# Patient Record
Sex: MALE | Race: WHITE | NOT HISPANIC OR LATINO | Employment: FULL TIME | URBAN - METROPOLITAN AREA
[De-identification: names, ages, dates, MRNs, and addresses within clinical notes are randomized per-mention and may not be internally consistent; named-entity substitution may affect disease eponyms.]

---

## 2017-01-20 DIAGNOSIS — E11.9 TYPE 2 DIABETES MELLITUS WITHOUT COMPLICATION, WITHOUT LONG-TERM CURRENT USE OF INSULIN (H): ICD-10-CM

## 2017-01-20 LAB — HBA1C MFR BLD: 7.1 % (ref 4.1–5.7)

## 2017-01-25 DIAGNOSIS — E11.9 TYPE 2 DIABETES MELLITUS WITHOUT COMPLICATION, WITHOUT LONG-TERM CURRENT USE OF INSULIN (H): Primary | ICD-10-CM

## 2017-01-25 RX ORDER — METFORMIN HCL 500 MG
TABLET, EXTENDED RELEASE 24 HR ORAL
Qty: 180 TABLET | Refills: 1 | Status: SHIPPED | OUTPATIENT
Start: 2017-01-25 | End: 2017-06-09

## 2017-01-25 NOTE — TELEPHONE ENCOUNTER
Prescription approved per [facility] Refill Protocol. Increase in dose per Dr Richards

## 2017-04-05 DIAGNOSIS — I10 ESSENTIAL HYPERTENSION: Primary | ICD-10-CM

## 2017-04-05 DIAGNOSIS — G47.00 INSOMNIA, UNSPECIFIED TYPE: ICD-10-CM

## 2017-04-05 RX ORDER — ZOLPIDEM TARTRATE 10 MG/1
10 TABLET ORAL
Qty: 30 TABLET | Refills: 0
Start: 2017-04-05 | End: 2017-06-09

## 2017-04-05 RX ORDER — PROPRANOLOL HYDROCHLORIDE 160 MG/1
160 CAPSULE, EXTENDED RELEASE ORAL DAILY
Qty: 90 CAPSULE | Refills: 0 | Status: SHIPPED | OUTPATIENT
Start: 2017-04-05 | End: 2017-06-09

## 2017-06-09 ENCOUNTER — OFFICE VISIT (OUTPATIENT)
Dept: FAMILY MEDICINE | Facility: CLINIC | Age: 52
End: 2017-06-09

## 2017-06-09 VITALS
OXYGEN SATURATION: 98 % | WEIGHT: 265.12 LBS | DIASTOLIC BLOOD PRESSURE: 89 MMHG | SYSTOLIC BLOOD PRESSURE: 138 MMHG | HEART RATE: 78 BPM | BODY MASS INDEX: 35.14 KG/M2 | HEIGHT: 73 IN | TEMPERATURE: 97.8 F

## 2017-06-09 DIAGNOSIS — G47.00 INSOMNIA, UNSPECIFIED TYPE: ICD-10-CM

## 2017-06-09 DIAGNOSIS — E11.9 TYPE 2 DIABETES MELLITUS WITHOUT COMPLICATION, WITHOUT LONG-TERM CURRENT USE OF INSULIN (H): ICD-10-CM

## 2017-06-09 DIAGNOSIS — Z23 NEED FOR PNEUMOCOCCAL VACCINATION: ICD-10-CM

## 2017-06-09 DIAGNOSIS — Z13.220 SCREENING CHOLESTEROL LEVEL: ICD-10-CM

## 2017-06-09 DIAGNOSIS — Z00.00 PREVENTATIVE HEALTH CARE: Primary | ICD-10-CM

## 2017-06-09 DIAGNOSIS — Z12.5 SCREENING FOR PROSTATE CANCER: ICD-10-CM

## 2017-06-09 DIAGNOSIS — Z12.11 SCREEN FOR COLON CANCER: ICD-10-CM

## 2017-06-09 DIAGNOSIS — I10 ESSENTIAL HYPERTENSION: ICD-10-CM

## 2017-06-09 LAB
ANION GAP SERPL CALCULATED.3IONS-SCNC: 7 MMOL/L (ref 3–14)
BUN SERPL-MCNC: 17 MG/DL (ref 7–30)
CALCIUM SERPL-MCNC: 9.1 MG/DL (ref 8.5–10.1)
CHLORIDE SERPL-SCNC: 106 MMOL/L (ref 94–109)
CHOLEST SERPL-MCNC: 176 MG/DL
CO2 SERPL-SCNC: 30 MMOL/L (ref 20–32)
CREAT SERPL-MCNC: 0.82 MG/DL (ref 0.66–1.25)
CREAT UR-MCNC: 145 MG/DL
GFR SERPL CREATININE-BSD FRML MDRD: ABNORMAL ML/MIN/1.7M2
GLUCOSE SERPL-MCNC: 142 MG/DL (ref 70–99)
HBA1C MFR BLD: 7.3 % (ref 4.1–5.7)
HDLC SERPL-MCNC: 48 MG/DL
LDLC SERPL CALC-MCNC: 103 MG/DL
MICROALBUMIN UR-MCNC: 17 MG/L
MICROALBUMIN/CREAT UR: 11.79 MG/G CR (ref 0–17)
NONHDLC SERPL-MCNC: 128 MG/DL
POTASSIUM SERPL-SCNC: 4.6 MMOL/L (ref 3.4–5.3)
PSA SERPL-ACNC: 0.82 UG/L (ref 0–4)
SODIUM SERPL-SCNC: 143 MMOL/L (ref 133–144)
TRIGL SERPL-MCNC: 125 MG/DL

## 2017-06-09 RX ORDER — ZOLPIDEM TARTRATE 10 MG/1
10 TABLET ORAL
Qty: 30 TABLET | Refills: 2 | Status: SHIPPED | OUTPATIENT
Start: 2017-06-09 | End: 2018-02-20

## 2017-06-09 RX ORDER — METFORMIN HCL 500 MG
TABLET, EXTENDED RELEASE 24 HR ORAL
Qty: 180 TABLET | Refills: 4 | Status: SHIPPED | OUTPATIENT
Start: 2017-06-09 | End: 2018-01-23

## 2017-06-09 RX ORDER — PROPRANOLOL HYDROCHLORIDE 160 MG/1
160 CAPSULE, EXTENDED RELEASE ORAL DAILY
Qty: 90 CAPSULE | Refills: 4 | Status: SHIPPED | OUTPATIENT
Start: 2017-06-09 | End: 2017-09-27

## 2017-06-09 ASSESSMENT — PAIN SCALES - GENERAL: PAINLEVEL: NO PAIN (0)

## 2017-06-09 NOTE — MR AVS SNAPSHOT
After Visit Summary   6/9/2017    Dougie Nino    MRN: [id number]           Patient Information     Date Of Birth          1965        Visit Information        Provider Department      6/9/2017 8:40 AM Live Richards MD [facility]        Today's Diagnoses     Screen for colon cancer    -  1    Need for pneumococcal vaccination        Essential hypertension        Screening cholesterol level        Type 2 diabetes mellitus without complication, without long-term current use of insulin (H)        Insomnia, unspecified type        Screening for prostate cancer          Care Instructions      Preventive Health Recommendations  Male Ages 50 - 64    Yearly exam:             See your health care provider every year in order to  o   Review health changes.   o   Discuss preventive care.    o   Review your medicines if your doctor has prescribed any.     Have a cholesterol test every 5 years, or more frequently if you are at risk for high cholesterol/heart disease.     Have a diabetes test (fasting glucose) every three years. If you are at risk for diabetes, you should have this test more often.     Have a colonoscopy at age 50, or have a yearly FIT test (stool test). These exams will check for colon cancer.      Talk with your health care provider about whether or not a prostate cancer screening test (PSA) is right for you.    You should be tested each year for STDs (sexually transmitted diseases), if you re at risk.     Shots: Get a flu shot each year. Get a tetanus shot every 10 years.     Nutrition:    Eat at least 5 servings of fruits and vegetables daily.     Eat whole-grain bread, whole-wheat pasta and brown rice instead of white grains and rice.     Talk to your provider about Calcium and Vitamin D.     Lifestyle    Exercise for at least 150 minutes a week (30 minutes a day, 5 days a week). This will help you control your weight and prevent disease.     Limit alcohol to one drink per  "day.     No smoking.     Wear sunscreen to prevent skin cancer.     See your dentist every six months for an exam and cleaning.     See your eye doctor every 1 to 2 years.            Follow-ups after your visit        Future tests that were ordered for you today     Open Future Orders        Priority Expected Expires Ordered    Fecal colorectal cancer screen FIT Routine 6/14/2017 8/16/2017 6/9/2017            Who to contact     Please call your clinic at [phone] to:    Ask questions about your health    Make or cancel appointments    Discuss your medicines    Learn about your test results    Speak to your doctor   If you have compliments or concerns about an experience at your clinic, or if you wish to file a complaint, please contact [facility] Patient Relations at [phone] or email us at [email or web address]         Additional Information About Your Visit        [employer] Information     [email or web address] gives you secure access to your electronic health record. If you see a primary care provider, you can also send messages to your care team and make appointments. If you have questions, please call your primary care clinic.  If you do not have a primary care provider, please call [phone] and they will assist you.      [email or web address] is an electronic gateway that provides easy, online access to your medical records. With [email or web address], you can request a clinic appointment, read your test results, renew a prescription or communicate with your care team.     To access your existing account, please contact your [facility] or call [phone] for assistance.        Care EveryWhere ID     This is your Care EveryWhere ID. This could be used by other organizations to access your [place] medical records  [id number]        Your Vitals Were     Pulse Temperature Height Pulse Oximetry BMI (Body Mass Index)       78 97.8  F (36.6  C) 6' 0.65\" (184.5 cm) 98% 35.32 " kg/m2        Blood Pressure from Last 3 Encounters:   06/09/17 138/89   05/23/16 131/85   10/22/15 129/84    Weight from Last 3 Encounters:   06/09/17 265 lb 1.9 oz (120.3 kg)   05/23/16 269 lb 0.6 oz (122 kg)   10/22/15 265 lb (120.2 kg)              We Performed the Following     ADMIN: Vaccine, Initial (28916)     Albumin Random Urine Quantitative     Basic metabolic panel     Hemoglobin A1c ([place])     Lipid Profile     Pneumococcal vaccine 13 valent PCV13 IM (Prevnar) [42216]     Prostate spec antigen screen          Where to get your medicines      These medications were sent to [facility] - [place], [place] - [address]  [address], [facility] [address]     Phone:  [phone]     metFORMIN 500 MG 24 hr tablet    propranolol 160 MG 24 hr capsule         Some of these will need a paper prescription and others can be bought over the counter.  Ask your nurse if you have questions.     Bring a paper prescription for each of these medications     zolpidem 10 MG tablet          Primary Care Provider Office Phone # Fax #    Live Richards [phone]       [facility] [address]  [facility] [address]        Thank you!     Thank you for choosing [facility]  for your care. Our goal is always to provide you with excellent care. Hearing back from our patients is one way we can continue to improve our services. Please take a few minutes to complete the written survey that you may receive in the mail after your visit with us. Thank you!             Your Updated Medication List - Protect others around you: Learn how to safely use, store and throw away your medicines at www.disposemymeds.org.          This list is accurate as of: 6/9/17  9:19 AM.  Always use your most recent med list.                   Brand Name Dispense Instructions for use    Aspirin 81 MG Tbdp      Take 1 tablet by mouth daily       ciprofloxacin 500 MG tablet    CIPRO    28  tablet    Take 1 tablet (500 mg) by mouth 2 times daily       ELEUTHERO PO      Take 1 tablet by mouth daily       GINKGO PO      Take 1 tablet by mouth daily       metFORMIN 500 MG 24 hr tablet    GLUCOPHAGE-XR    180 tablet    Take 1 po daily with breakfast, and 1 daily with evening meal       propranolol 160 MG 24 hr capsule    INDERAL LA    90 capsule    Take 1 capsule (160 mg) by mouth daily       zolpidem 10 MG tablet    AMBIEN    30 tablet    Take 1 tablet (10 mg) by mouth nightly as needed for sleep

## 2017-06-09 NOTE — NURSING NOTE
"52 year old  Chief Complaint   Patient presents with     Physical     Refill Request       Blood pressure 138/89, pulse 78, temperature 97.8  F (36.6  C), height 6' 0.65\" (184.5 cm), weight 265 lb 1.9 oz (120.3 kg), SpO2 98 %. Body mass index is 35.32 kg/(m^2).  Patient Active Problem List   Diagnosis     Insomnia     Type 2 diabetes mellitus without complication (H)     Essential hypertension     Preventative health care     Essential hypertension with goal blood pressure less than 140/90       Wt Readings from Last 2 Encounters:   06/09/17 265 lb 1.9 oz (120.3 kg)   05/23/16 269 lb 0.6 oz (122 kg)     BP Readings from Last 3 Encounters:   06/09/17 138/89   05/23/16 131/85   10/22/15 129/84         Current Outpatient Prescriptions   Medication     metFORMIN (GLUCOPHAGE-XR) 500 MG 24 hr tablet     propranolol (INDERAL LA) 160 MG 24 hr capsule     zolpidem (AMBIEN) 10 MG tablet     ciprofloxacin (CIPRO) 500 MG tablet     Ginkgo Biloba (GINKGO PO)     Ginseng (ELEUTHERO PO)     Aspirin 81 MG TBDP     [DISCONTINUED] propranolol (INDERAL LA) 160 MG 24 hr capsule     [DISCONTINUED] metFORMIN (GLUCOPHAGE-XR) 500 MG 24 hr tablet     No current facility-administered medications for this visit.        Social History   Substance Use Topics     Smoking status: Never Smoker     Smokeless tobacco: Never Used     Alcohol use Yes      Comment: rarely       Health Maintenance Due   Topic Date Due     PNEUMOVAX 1X HI RISK PATIENT < 65 (NO IB MSG)  04/04/1967     FIT Q1 YR  08/20/2013     EYE EXAM Q1 YEAR  11/05/2016     FOOT EXAM Q1 YEAR  05/23/2017     CREATININE Q1 YEAR  05/23/2017     LIPID MONITORING Q1 YEAR  05/23/2017     MICROALBUMIN Q1 YEAR  05/23/2017       No results found for: DENISHA Johnson CMA  June 9, 2017 9:36 AM    Screening Questionnaire for Adult Immunization    Are you sick today?   No   Do you have allergies to medications, food, a vaccine component or latex?   No   Have you ever had a serious reaction after " receiving a vaccination?   No   Do you have a long-term health problem with heart disease, lung disease, asthma, kidney disease, metabolic disease (e.g. diabetes), anemia, or other blood disorder?   No   Do you have cancer, leukemia, HIV/AIDS, or any other immune system problem?   No   In the past 3 months, have you taken medications that affect  your immune system, such as prednisone, other steroids, or anticancer drugs; drugs for the treatment of rheumatoid arthritis, Crohn s disease, or psoriasis; or have you had radiation treatments?   No   Have you had a seizure, or a brain or other nervous system problem?   No   During the past year, have you received a transfusion of blood or blood     products, or been given immune (gamma) globulin or antiviral drug?   No   For women: Are you pregnant or is there a chance you could become        pregnant during the next month?   No   Have you received any vaccinations in the past 4 weeks?   No     Immunization questionnaire answers were all negative.     Given by Elicia Johnson. Patient instructed to remain in clinic for 20 minutes afterwards, and to report any adverse reaction to me immediately.       Screening performed by Elicia Johnson on 6/9/2017 at 9:36 AM.

## 2017-06-10 NOTE — PROGRESS NOTES
CHIEF COMPLAINT:  Physical exam.      HISTORY OF PRESENT ILLNESS:  Dougie is a 52 year old here for the above.  Overall he is doing quite well.  He was laid off from [employer] and now works for [employer].  He is technically based out of [place] and he is spending about 3 weeks out of every month there.  If he is not there, he is typically in [place] where he has a large project under way.      He is  and has kids and they have decided to stay here for the time being.  Overall, things are working out fairly well.      Dougie shares with me that he had 7 years of some pretty significant troubles.  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  In 2011, he had to file for bankruptcy.  He was on a 5-year program and that ended about a year ago.  He had been renting a house at one point, subleased it and was sued by the landlord.  He had a large settlement with that.  In 2014, he bought a house.  Finally, things have really turned around for him.      Active medical problems including insomnia, type 2 diabetes without complication, and essential hypertension.      From a medication standpoint, he is on Inderal- mg daily.  He has been on a variety of different medications and this really helped him more than anything.  He could not tolerate the ACE inhibitors.  He does not like the idea of being on a diuretic.  He is on metformin  mg daily with breakfast for his type 2 diabetes and for the most part, things look quite good.  He is not on a statin.  From a diabetic care standpoint, A1c has been less than 8%.  LDL levels have been very close to 100.  Blood pressure is at goal at 138/89.  He has not been taking the aspirin though we have talked about this in the past.  He simply forgot to do it.  He will start that up again.  He does not smoke.      SOCIAL, FAMILY AND PAST SURGICAL HISTORY:  Unchanged.      SOCIAL HISTORY:  Update above.      HEALTHCARE MAINTENANCE:  He has had some changes  notices this mainly with his reading glasses.  He understands the importance of having an annual eye exam especially in the context of diabetes.  Hearing is fine and there has been no real change there.  Dental care occurs every 6 months.  /89.  Body mass index is 35.32.  His weight is down 4 pounds intentionally.  He would like to lose some more weight, of course.  He had a flu shot in the fall.  He is up-to-date with a tetanus booster.  He needs PCV 13 so we are going to go ahead and do that today.  We discussed colon cancer screening and would like to do that in the form of a FIT card.  We will check a PSA 50, lipid panel, A1c, basic metabolic panel and a urine microalbumin level.      REVIEW OF SYSTEMS:  A 10-point review negative.      OBJECTIVE:  Dougie is in no distress.  Affect upbeat.  Good eye contact.  /89 with a pulse of 78.  Temperature is 97.8.  He is 6 feet 0.65 inches in height and weighs 265 with a BMI of 35.32.  O2 sats 98% on room air.  HEENT:  Head is normocephalic, atraumatic.  Ears are free of any cerumen.  The TMs look fine.  There is no pain with palpation of the sinuses.  Eyes are grossly normal.  Nose is free of any congestion or discharge.  Teeth in good repair.  Mucous membranes are moist.  Throat looks benign.  Neck is supple without adenopathy or thyromegaly.  No carotid bruits are heard, no JVD.  Back is smooth and straight.  No pain to percussion.  No CVA tenderness.  Lungs are clear to auscultation bilaterally.  Heart reveals a regular rate and rhythm without murmur.  Abdomen is benign.  Ankles are free of any edema.  Skin is warm and dry.      LABORATORY DATA:  Labs are pending.      ASSESSMENT AND PLAN:   1. Adult physical exam, up-to-date with health care maintenance strategies.   2. Type 2 diabetes without complication, without the long-term use of insulin.  Continue with the metformin.  We will check his labs.  Foot exam was performed today and monofilament testing was  perfect on the tops and bottoms of the feet.  No changes.  Good dorsalis pedis pulses are present.  At near or at optimal care.  His LDL is the only thing that has been holding him back essentially.     3. He has had a lot of stress in his life and has had some degree of PTSD like symptoms.  I will be happy to refer him to our therapists at the [facility].  He is open to talking to somebody.  He is not looking for any medication at this time.   4. Refills on his meds were taken care of including zolpidem, propranolol and metformin.     5. Call with any problems or questions.  I look forward to seeing him back in approximately 6 months for diabetic care and 1 year for a physical exam.

## 2017-06-14 NOTE — NURSING NOTE
Rx called in to pt's pharmacy in [facility]  Zolpidem # 30 with 2 refills   Yesi Whittaker RN  June 9, 2017 5:05 PM

## 2017-07-24 DIAGNOSIS — Z12.11 SCREEN FOR COLON CANCER: ICD-10-CM

## 2017-07-24 PROCEDURE — 82274 ASSAY TEST FOR BLOOD FECAL: CPT | Performed by: FAMILY MEDICINE

## 2017-07-27 LAB — HEMOCCULT STL QL IA: NEGATIVE

## 2017-09-27 DIAGNOSIS — I10 ESSENTIAL HYPERTENSION: ICD-10-CM

## 2017-09-27 DIAGNOSIS — I10 ESSENTIAL HYPERTENSION, BENIGN: Primary | ICD-10-CM

## 2017-09-27 RX ORDER — PROPRANOLOL HYDROCHLORIDE 160 MG/1
160 CAPSULE, EXTENDED RELEASE ORAL DAILY
Qty: 90 CAPSULE | Refills: 3 | Status: SHIPPED | OUTPATIENT
Start: 2017-09-27 | End: 2018-08-21

## 2018-01-23 ENCOUNTER — OFFICE VISIT (OUTPATIENT)
Dept: FAMILY MEDICINE | Facility: CLINIC | Age: 53
End: 2018-01-23
Payer: COMMERCIAL

## 2018-01-23 ENCOUNTER — OFFICE VISIT (OUTPATIENT)
Dept: PHARMACY | Facility: CLINIC | Age: 53
End: 2018-01-23
Payer: COMMERCIAL

## 2018-01-23 VITALS
OXYGEN SATURATION: 96 % | HEIGHT: 73 IN | DIASTOLIC BLOOD PRESSURE: 85 MMHG | HEART RATE: 95 BPM | SYSTOLIC BLOOD PRESSURE: 130 MMHG | TEMPERATURE: 97.7 F | BODY MASS INDEX: 33.13 KG/M2 | WEIGHT: 250.01 LBS

## 2018-01-23 VITALS
BODY MASS INDEX: 33.53 KG/M2 | DIASTOLIC BLOOD PRESSURE: 88 MMHG | HEIGHT: 73 IN | OXYGEN SATURATION: 97 % | RESPIRATION RATE: 14 BRPM | TEMPERATURE: 98 F | WEIGHT: 253 LBS | HEART RATE: 89 BPM | SYSTOLIC BLOOD PRESSURE: 136 MMHG

## 2018-01-23 DIAGNOSIS — E11.9 TYPE 2 DIABETES MELLITUS WITHOUT COMPLICATION, WITHOUT LONG-TERM CURRENT USE OF INSULIN (H): Primary | ICD-10-CM

## 2018-01-23 DIAGNOSIS — R35.0 URINARY FREQUENCY: ICD-10-CM

## 2018-01-23 DIAGNOSIS — I10 ESSENTIAL HYPERTENSION: ICD-10-CM

## 2018-01-23 LAB
BILIRUBIN UR: NEGATIVE
BLOOD UR: NEGATIVE
BUN SERPL-MCNC: 18 MG/DL (ref 7–30)
CALCIUM SERPL-MCNC: 9.5 MG/DL (ref 8.5–10.4)
CHLORIDE SERPLBLD-SCNC: 100 MMOL/L (ref 94–109)
CO2 SERPL-SCNC: 28 MMOL/L (ref 20–32)
CREAT SERPL-MCNC: 0.9 MG/DL (ref 0.8–1.5)
EGFR CALCULATED (BLACK REFERENCE): 114.4
EGFR CALCULATED (NON BLACK REFERENCE): 94.6
GLUCOSE SERPL-MCNC: 541 MG/DL (ref 60–109)
GLUCOSE URINE: ABNORMAL
HBA1C MFR BLD: 14 % (ref 4.1–5.7)
KETONES UR QL: NEGATIVE
LEUKOCYTE ESTERASE UR: NEGATIVE
NITRITE UR QL STRIP: NEGATIVE
PH UR STRIP: 6 [PH] (ref 5–7)
POTASSIUM SERPL-SCNC: 5 MMOL/L (ref 3.4–5.3)
PROTEIN UR: NEGATIVE
SODIUM SERPL-SCNC: 137 MMOL/L (ref 133–144)
SP GR UR STRIP: 1.01
TSH SERPL DL<=0.005 MIU/L-ACNC: 2.99 MU/L (ref 0.4–4)
UROBILINOGEN UR STRIP-ACNC: ABNORMAL

## 2018-01-23 RX ORDER — METFORMIN HCL 500 MG
1000 TABLET, EXTENDED RELEASE 24 HR ORAL
Qty: 30 TABLET | Refills: 1 | Status: SHIPPED | OUTPATIENT
Start: 2018-01-23 | End: 2018-01-23

## 2018-01-23 RX ORDER — METFORMIN HCL 500 MG
1000 TABLET, EXTENDED RELEASE 24 HR ORAL
Qty: 60 TABLET | Refills: 1 | Status: SHIPPED | OUTPATIENT
Start: 2018-01-23 | End: 2018-01-23

## 2018-01-23 RX ORDER — METFORMIN HCL 500 MG
1000 TABLET, EXTENDED RELEASE 24 HR ORAL
Qty: 60 TABLET | Refills: 1 | Status: SHIPPED | OUTPATIENT
Start: 2018-01-23 | End: 2018-02-08

## 2018-01-23 ASSESSMENT — ANXIETY QUESTIONNAIRES
7. FEELING AFRAID AS IF SOMETHING AWFUL MIGHT HAPPEN: NOT AT ALL
2. NOT BEING ABLE TO STOP OR CONTROL WORRYING: NOT AT ALL
IF YOU CHECKED OFF ANY PROBLEMS ON THIS QUESTIONNAIRE, HOW DIFFICULT HAVE THESE PROBLEMS MADE IT FOR YOU TO DO YOUR WORK, TAKE CARE OF THINGS AT HOME, OR GET ALONG WITH OTHER PEOPLE: NOT DIFFICULT AT ALL
5. BEING SO RESTLESS THAT IT IS HARD TO SIT STILL: NOT AT ALL
GAD7 TOTAL SCORE: 0
1. FEELING NERVOUS, ANXIOUS, OR ON EDGE: NOT AT ALL
6. BECOMING EASILY ANNOYED OR IRRITABLE: NOT AT ALL
3. WORRYING TOO MUCH ABOUT DIFFERENT THINGS: NOT AT ALL

## 2018-01-23 ASSESSMENT — PATIENT HEALTH QUESTIONNAIRE - PHQ9
SUM OF ALL RESPONSES TO PHQ QUESTIONS 1-9: 0
5. POOR APPETITE OR OVEREATING: NOT AT ALL

## 2018-01-23 ASSESSMENT — PAIN SCALES - GENERAL
PAINLEVEL: NO PAIN (0)
PAINLEVEL: NO PAIN (0)

## 2018-01-23 NOTE — MR AVS SNAPSHOT
After Visit Summary   1/23/2018    Dougie Nino    MRN: [id number]           Patient Information     Date Of Birth          1965        Visit Information        Provider Department      1/23/2018 8:40 AM Deena Pelletier PA-C [facility]        Today's Diagnoses     Urinary frequency    -  1    Type 2 diabetes mellitus without complication, without long-term current use of insulin (H)        Essential hypertension          Care Instructions    Diabetic education was ordered.  It is important to start checking your blood sugars and learn more about diet expecially when you are on the road traveling.    We increased your metformin and changed to a long acting formulation.    If you are able to make an appointment with the diabetic educator you can follow up with Dr. Richards in 3 months.    Your blood pressure was elevated today but it was improved on recheck.  For now continue on your current dose of medication. Check your blood pressures once weekly and if consistently above 130/90 we should consider additional treatment for your blood pressure.   You can manage with lifestyle modifications with a DASH diet and I have enclosed information on that.    Consider taking a baby aspirin once daily if you do not already do so.    Please let me know if you have any questions.  Thank you for allowing me to participate in your care.  It was my pleasure meeting you.  Lisa Pelletier PA-C      Eating Heart-Healthy Food: Using the DASH Plan    Eating for your heart doesn t have to be hard or boring. You just need to know how to make healthier choices. The DASH eating plan has been developed to help you do just that. DASH stands for Dietary Approaches to Stop Hypertension. It is a plan that has been proven to be healthier for your heart and to lower your risk for high blood pressure. It can also help lower your risk for cancer, heart disease, osteoporosis, and diabetes.  Choosing from each food  group  Choose foods from each of the food groups below each day. Try to get the recommended number of servings for each food group. The serving numbers are based on a diet of 2,000 calories a day. Talk to your doctor if you re unsure about your calorie needs. Along with getting the correct servings, the DASH plan also recommends a sodium intake less than 2,300 mg per day.        Grains  Servings: 6 to 8 a day  A serving is:    1 slice bread    1 ounce dry cereal    Half a cup cooked rice, pasta or cereal  Best choices: Whole grains and any grains high in fiber. Vegetables  Servings: 4 to 5 a day  A serving is:    1 cup raw leafy vegetable    Half a cup cut-up raw or cooked vegetable    Half a cup vegetable juice  Best choices: Fresh or frozen vegetables prepared without added salt or fat.   Fruits  Servings: 4 to 5 a day  A serving is:    1 medium fruit    One-quarter cup dried fruit    Half a cup fresh, frozen, or canned fruit    Half a cup of 100% fruit juices  Best choices: A variety of fresh fruits of different colors. Whole fruits are a better choice than fruit juices. Low-fat or fat-free dairy  Servings: 2 to 3 a day  A serving is:    1 cup milk    1 cup yogurt    One and a half ounces cheese  Best choices: Skim or 1% milk, low-fat or fat-free yogurt or buttermilk, and low-fat cheeses.         Lean meats, poultry, fish  Servings: 6 or fewer a day  A serving is:    1 ounce cooked meats, poultry, or fish    1 egg  Best choices: Lean poultry and fish. Trim away visible fat. Broil, grill, roast, or boil instead of frying. Remove skin from poultry before eating. Limit how much red meat you eat.  Nuts, seeds, beans  Servings: 4 to 5 a week  A serving is:    One-third cup nuts (one and a half ounces)    2 tablespoons nut butter or seeds    Half a cup cooked dry beans or legumes  Best choices: Dry roasted nuts with no salt added, lentils, kidney beans, garbanzo beans, and whole hong beans.   Fats and oils  Servings:  2 to 3 a day  A serving is:    1 teaspoon vegetable oil    1 teaspoon soft margarine    1 tablespoon mayonnaise    2 tablespoons salad dressing  Best choices: Nut and vegetable oils (nontropical vegetable oils), such as olive and canola oil. Sweets  Servings: 5 a week or fewer  A serving is:    1 tablespoon sugar, maple syrup, or honey    1 tablespoon jam or jelly    1 half-ounce jelly beans (about 15)    1 cup lemonade  Best choices: Dried fruit can be a satisfying sweet. Choose low-fat sweets. And watch your serving sizes!      For more on the DASH eating plan, visit:  www.nhlbi.nih.gov/health/health-topics/topics/dash   Date Last Reviewed: 6/1/2016 2000-2017 [employer]. [address] [place] [address]. All rights reserved. This information is not intended as a substitute for professional medical care. Always follow your healthcare professional's instructions.                Follow-ups after your visit        Additional Services     DIABETES EDUCATOR REFERRAL       DIABETES SELF MANAGEMENT TRAINING (DSMT)      Your provider has referred you to Diabetes Education: [facility] [[email or web address]]: Diabetes Education - [facility] [phone] [email or web address]    A  will call you to make your appointment. If it has been more than 3 business days since your referral was placed, please call the above phone number to schedule.    Type of training and number of hours: Previous Diagnosis: Follow-up DSMT - 2 hours.    Medicare covers: 10 hours of initial DSMT in 12 month period from the time of first visit, plus 2 hours of follow-up DSMT annually, and additional hours as requested for insulin training.    Diabetes Type: Type 2 - Diet Control             Diabetes Co-Morbidities: dyslipidemia and hypertension               A1C Goal:  <7.0       A1C is: Lab Results       Component                Value                Date                       A1C                      7.3                 06/09/2017              Diabetes Education Topics: Comprehensive Knowledge Assessment and Instruction    Special Educational Needs Requiring Individual DSMT: None       MEDICAL NUTRITION THERAPY (MNT) for Diabetes    Medical Nutrition Therapy with a Registered Dietitian can be provided in coordination with Diabetes Self-Management Training to assist in achieving optimal diabetes management.    MNT Type and Hours: Previous diagnosis: But patient has never done diabetic education.                     Medicare will cover: 3 hours initial MNT in 12 month period after first visit, plus 2 hours of follow-up MNT annually    Please be aware that coverage of these services is subject to the terms and limitations of your health insurance plan.  Call member services at your health plan to determine Diabetes Self-Management Training (Codes  &amp; ) and Medical Nutrition Therapy (Codes 95925 & 25968) benefits and ask which blood glucose monitor brands are covered by your plan.  Please bring the following with you to your appointment:    (1)  List of current medications   (2)  List of Blood Glucose Monitor brands that are covered by your insurance plan  (3)  Blood Glucose Monitor and log book  (4)   Food records for the 3 days prior to your visit    The Certified Diabetes Educator may make diabetes medication adjustments per the CDE Protocol and Collaborative Practice Agreement.                  Who to contact     Please call your clinic at [phone] to:    Ask questions about your health    Make or cancel appointments    Discuss your medicines    Learn about your test results    Speak to your doctor   If you have compliments or concerns about an experience at your clinic, or if you wish to file a complaint, please contact [facility] Patient Relations at [phone] or email us at  [email or web address]         Additional Information About Your Visit        Libersyhart Information     VIOSO gives you secure access to your electronic health record. If you see a primary care provider, you can also send messages to your care team and make appointments. If you have questions, please call your primary care clinic.  If you do not have a primary care provider, please call [phone] and they will assist you.      VIOSO is an electronic gateway that provides easy, online access to your medical records. With VIOSO, you can request a clinic appointment, read your test results, renew a prescription or communicate with your care team.     To access your existing account, please contact your [facility] or call [phone] for assistance.        Care EveryWhere ID     This is your Care EveryWhere ID. This could be used by other organizations to access your [facility] medical records  [id number]        Your Vitals Were     Pulse Temperature Height Pulse Oximetry BMI (Body Mass Index)       95 97.7  F (36.5  C) (Oral) 6' 0.64\" (184.5 cm) 96% 33.31 kg/m2        Blood Pressure from Last 3 Encounters:   01/23/18 130/85   06/09/17 138/89   05/23/16 131/85    Weight from Last 3 Encounters:   01/23/18 250 lb 0.1 oz (113.4 kg)   06/09/17 265 lb 1.9 oz (120.3 kg)   05/23/16 269 lb 0.6 oz (122 kg)              We Performed the Following     Basic Metabolic Panel ([place])     DIABETES EDUCATOR REFERRAL     Hemoglobin A1c ([place])     TSH with free T4 reflex     Urinalysis ([place])          Today's Medication Changes          These changes are accurate as of: 1/23/18  9:25 AM.  If you have any questions, ask your nurse or doctor.               These medicines have changed or have updated prescriptions.        Dose/Directions    * metFORMIN 500 MG 24 hr tablet   Commonly known as:  GLUCOPHAGE-XR   This may have changed:  Another medication with the same name was " added. Make sure you understand how and when to take each.   Used for:  Type 2 diabetes mellitus without complication, without long-term current use of insulin (H)   Changed by:  Live Richards MD        Take 1 po daily with breakfast, and 1 daily with evening meal   Quantity:  180 tablet   Refills:  4       * metFORMIN 500 MG 24 hr tablet   Commonly known as:  GLUCOPHAGE-XR   This may have changed:  You were already taking a medication with the same name, and this prescription was added. Make sure you understand how and when to take each.   Used for:  Type 2 diabetes mellitus without complication, without long-term current use of insulin (H)   Changed by:  Deena Pelletier PA-C        Dose:  1000 mg   Take 2 tablets (1,000 mg) by mouth daily (with dinner)   Quantity:  60 tablet   Refills:  1       * metFORMIN 500 MG 24 hr tablet   Commonly known as:  GLUCOPHAGE-XR   This may have changed:  You were already taking a medication with the same name, and this prescription was added. Make sure you understand how and when to take each.   Used for:  Type 2 diabetes mellitus without complication, without long-term current use of insulin (H)   Changed by:  Deena Pelletier PA-C        Dose:  1000 mg   Take 2 tablets (1,000 mg) by mouth daily (with dinner)   Quantity:  60 tablet   Refills:  1       * Notice:  This list has 3 medication(s) that are the same as other medications prescribed for you. Read the directions carefully, and ask your doctor or other care provider to review them with you.      Stop taking these medicines if you haven't already. Please contact your care team if you have questions.     ciprofloxacin 500 MG tablet   Commonly known as:  CIPRO   Stopped by:  Deena Pelletier PA-C                Where to get your medicines      These medications were sent to [place] [address] [place] [facility] [place] - [address]  [address] [address]     Phone:  [phone]      metFORMIN 500 MG 24 hr tablet         Some of these will need a paper prescription and others can be bought over the counter.  Ask your nurse if you have questions.     Bring a paper prescription for each of these medications     metFORMIN 500 MG 24 hr tablet                Primary Care Provider Office Phone # Fax #    Live Richards [phone] [address] [place] [address]        Equal Access to Services     NAVNEET ARAUJO : Hadii aad ku hadasho Soomaali, waaxda luqadaha, qaybta kaalmada adeegyada, waxay idiin hayaan angeli lynnbrandenangelique lashayy lazaro. So [facility] [phone].    ATENCIÓN: Si habla español, tiene a dutta disposición servicios gratuitos de asistencia lingüística. [facility] [phone].    We comply with applicable federal civil rights laws and [place] laws. We do not discriminate on the basis of race, color, national origin, age, disability, sex, sexual orientation, or gender identity.            Thank you!     Thank you for choosing [facility]  for your care. Our goal is always to provide you with excellent care. Hearing back from our patients is one way we can continue to improve our services. Please take a few minutes to complete the written survey that you may receive in the mail after your visit with us. Thank you!             Your Updated Medication List - Protect others around you: Learn how to safely use, store and throw away your medicines at www.disposemymeds.org.          This list is accurate as of: 1/23/18  9:25 AM.  Always use your most recent med list.                   Brand Name Dispense Instructions for use Diagnosis    Aspirin 81 MG Tbdp      Take 1 tablet by mouth daily        ELEUTHERO PO      Take 1 tablet by mouth daily        GINKGO PO      Take 1 tablet by mouth daily        * metFORMIN 500 MG 24 hr tablet    GLUCOPHAGE-XR    180 tablet    Take 1 po daily with breakfast, and 1 daily with evening meal    Type 2 diabetes mellitus without  complication, without long-term current use of insulin (H)       * metFORMIN 500 MG 24 hr tablet    GLUCOPHAGE-XR    60 tablet    Take 2 tablets (1,000 mg) by mouth daily (with dinner)    Type 2 diabetes mellitus without complication, without long-term current use of insulin (H)       * metFORMIN 500 MG 24 hr tablet    GLUCOPHAGE-XR    60 tablet    Take 2 tablets (1,000 mg) by mouth daily (with dinner)    Type 2 diabetes mellitus without complication, without long-term current use of insulin (H)       propranolol 160 MG 24 hr capsule    INDERAL LA    90 capsule    Take 1 capsule (160 mg) by mouth daily    Essential hypertension, benign       zolpidem 10 MG tablet    AMBIEN    30 tablet    Take 1 tablet (10 mg) by mouth nightly as needed for sleep    Insomnia, unspecified type       * Notice:  This list has 3 medication(s) that are the same as other medications prescribed for you. Read the directions carefully, and ask your doctor or other care provider to review them with you.

## 2018-01-23 NOTE — MR AVS SNAPSHOT
"              After Visit Summary   1/23/2018    Dougie Nino    MRN: [id number]           Patient Information     Date Of Birth          1965        Visit Information        Provider Department      1/23/2018 1:30 PM Luci Posadas, PharmD [facility]        Care Instructions    Goal fasting blood sugar:     Each day that your fasting blood sugar is above 130, increase insulin by 1 unit.  If fasting blood sugar ever falls below 130, stop increasing insulin. Follow up with [facility] in 2 weeks. Stop increasing insulin in 2 weeks.     Low blood sugar of concern is below 70. If this occurs, you should \"correct\" with half a cup of juice.  You can also correct with 15 grams of glucose tablets.    Ask insurance company what \"basal\" or \"long acting\" insulin they cover.  Common long acting insulins are:    Insulin glargine  Insulin degludec  Insulin detemir            Follow-ups after your visit        Who to contact     Please call your clinic at [phone] to:    Ask questions about your health    Make or cancel appointments    Discuss your medicines    Learn about your test results    Speak to your doctor   If you have compliments or concerns about an experience at your clinic, or if you wish to file a complaint, please contact [facility] Patient Relations at [phone] or email us at [email or web address]         Additional Information About Your Visit        MyChart Information     skyrockit gives you secure access to your electronic health record. If you see a primary care provider, you can also send messages to your care team and make appointments. If you have questions, please call your primary care clinic.  If you do not have a primary care provider, please call [phone] and they will assist you.      skyrockit is an electronic gateway that provides easy, online access to your medical records. With skyrockit, you can request a clinic " "appointment, read your test results, renew a prescription or communicate with your care team.     To access your existing account, please contact your [facility] or call [phone] for assistance.        Care EveryWhere ID     This is your Care EveryWhere ID. This could be used by other organizations to access your [facility] medical records  [id number]        Your Vitals Were     Pulse Temperature Respirations Height Pulse Oximetry BMI (Body Mass Index)    89 98  F (36.7  C) (Oral) 14 6' 1\" (185.4 cm) 97% 33.38 kg/m2       Blood Pressure from Last 3 Encounters:   01/23/18 136/88   01/23/18 130/85   06/09/17 138/89    Weight from Last 3 Encounters:   01/23/18 253 lb (114.8 kg)   01/23/18 250 lb 0.1 oz (113.4 kg)   06/09/17 265 lb 1.9 oz (120.3 kg)              Today, you had the following     No orders found for display         Today's Medication Changes          These changes are accurate as of: 1/23/18  2:42 PM.  If you have any questions, ask your nurse or doctor.               Start taking these medicines.        Dose/Directions    insulin glargine 100 UNIT/ML injection   Commonly known as:  LANTUS   Used for:  Type 2 diabetes mellitus without complication, without long-term current use of insulin (H)   Started by:  Deena Pelletier PA-C        Dose:  10 Units   Inject 10 Units Subcutaneous At Bedtime   Quantity:  3 mL   Refills:  1       insulin pen needle 31G X 6 MM   Used for:  Type 2 diabetes mellitus without complication, without long-term current use of insulin (H)   Started by:  Deena Pelletier PA-C        Use once daily or as directed.   Quantity:  100 each   Refills:  1         These medicines have changed or have updated prescriptions.        Dose/Directions    metFORMIN 500 MG 24 hr tablet   Commonly known as:  GLUCOPHAGE-XR   This may have changed:    - how much to take  - how to take this  - when to take this  - additional instructions   Used for:  Type 2 " diabetes mellitus without complication, without long-term current use of insulin (H)   Changed by:  Deena Pelletier PA-C        Dose:  1000 mg   Take 2 tablets (1,000 mg) by mouth daily (with dinner)   Quantity:  60 tablet   Refills:  1         Stop taking these medicines if you haven't already. Please contact your care team if you have questions.     ciprofloxacin 500 MG tablet   Commonly known as:  CIPRO   Stopped by:  Deena Pelletier PA-C                Where to get your medicines      These medications were sent to [place] [address] [place] [facility] - [place], [place] - [address]  [address] [facility] [address]     Phone:  [phone]     insulin glargine 100 UNIT/ML injection    insulin pen needle 31G X 6 MM    metFORMIN 500 MG 24 hr tablet                Primary Care Provider Office Phone # Fax #    Live Richards [phone]       [address] [place] [address]        Equal Access to Services     NAVNEET [facility] Health CenterJANEE : Hadii aad ku hadasho Soomaali, waaxda luqadaha, qaybta kaalmada adeegyada, waxay idiin hayraine connolly . So [facility] [phone].    ATENCIÓN: Si habla español, tiene a dutta disposición servicios gratuitos de asistencia lingüística. [facility] [phone].    We comply with applicable federal civil rights laws and [place] laws. We do not discriminate on the basis of race, color, national origin, age, disability, sex, sexual orientation, or gender identity.            Thank you!     Thank you for choosing [facility]  for your care. Our goal is always to provide you with excellent care. Hearing back from our patients is one way we can continue to improve our services. Please take a few minutes to complete the written survey that you may receive in the mail after your visit with us. Thank you!             Your Updated Medication List - Protect others around you: Learn how to safely use, store and throw away your medicines at  www.disposemymeds.org.          This list is accurate as of: 1/23/18  2:42 PM.  Always use your most recent med list.                   Brand Name Dispense Instructions for use Diagnosis    Aspirin 81 MG Tbdp      Take 1 tablet by mouth daily        ELEUTHERO PO      Take 1 tablet by mouth daily        GINKGO PO      Take 1 tablet by mouth daily        insulin glargine 100 UNIT/ML injection    LANTUS    3 mL    Inject 10 Units Subcutaneous At Bedtime    Type 2 diabetes mellitus without complication, without long-term current use of insulin (H)       insulin pen needle 31G X 6 MM     100 each    Use once daily or as directed.    Type 2 diabetes mellitus without complication, without long-term current use of insulin (H)       metFORMIN 500 MG 24 hr tablet    GLUCOPHAGE-XR    60 tablet    Take 2 tablets (1,000 mg) by mouth daily (with dinner)    Type 2 diabetes mellitus without complication, without long-term current use of insulin (H)       propranolol 160 MG 24 hr capsule    INDERAL LA    90 capsule    Take 1 capsule (160 mg) by mouth daily    Essential hypertension, benign       zolpidem 10 MG tablet    AMBIEN    30 tablet    Take 1 tablet (10 mg) by mouth nightly as needed for sleep    Insomnia, unspecified type

## 2018-01-23 NOTE — PROGRESS NOTES
"SUBJECTIVE: Dougie is a 52 year old male who was referred by Deena Pelletier for insulin teaching and a complete medication review.     Today, at Dougie's office visit, his A1c was measured at 14.0.  He was given a prescription for insulin glargine and when he went to the pharmacy, learned that the prescription would cost $200.  He is interested in calling his insurance company to find what long acting insulins are covered.    Prior to today, Dougie notes that he was not taking his metformin as prescribed - he would always take the morning dose, but would more often than not forget the evening dose.  He is confident that if his full dose of metformin is dosed in the morning, he will be able to take it.    Dougie takes propranolol for blood pressure and also notes that it makes him feel \"steady.\"  I explored if he had anxiety symptoms in the past and this is unclear - it may be helping some symptoms of anxiety.  It sounds like he also may have a history of tachycardia.    We had a discussion about statins.  Dougie is somewhat open to starting a statin, but was less convinced, actually, after we looked at the [place] decision aid regarding statins which showed that he has a 8/100 risk of an event in ten years that would be decreased to 5/100 chance if he were on a high intensity statin.    Environmental factors that impact patient: Dougie travels quite a bit for his work with [employer].      Patient reports difficulty with taking twice daily doses of medications per above.    Review of Potential Medication Side Effects: No GI side effects with metformin currently, some \"slowing\" with propranolol, but Dougie feels pretty comfortable on this medication.    Patient Active Problem List   Diagnosis     Insomnia     Essential hypertension     Preventative health care     Type 2 diabetes mellitus without complication, without long-term current use of insulin (H)        OBJECTIVE:     CLAIRE-7 SCORE 1/23/2018   Total Score 0       PHQ-9 SCORE " "1/23/2018   Total Score 0         VITALS:  BP Readings from Last 3 Encounters:   01/23/18 136/88   01/23/18 130/85   06/09/17 138/89           Weight:   Wt Readings from Last 1 Encounters:   01/23/18 253 lb (114.8 kg)       Height:   Ht Readings from Last 1 Encounters:   01/23/18 6' 1\" (185.4 cm)       LABORATORY VALUES:   Last A1C:    Lab Results   Component Value Date    A1C 14.0 01/23/2018   .    Last Basic Metabolic Panel:  Lab Results   Component Value Date    .0 01/23/2018      Lab Results   Component Value Date    POTASSIUM 5.0 01/23/2018     Lab Results   Component Value Date    CHLORIDE 100.0 01/23/2018     Lab Results   Component Value Date    JUHI 9.5 01/23/2018     Lab Results   Component Value Date    CO2 28.0 01/23/2018     Lab Results   Component Value Date    BUN 18.0 01/23/2018     Lab Results   Component Value Date    CR 0.9 01/23/2018     Lab Results   Component Value Date    .0 01/23/2018       Lipid Panel Labs  Lab Results   Component Value Date    CHOL 176 06/09/2017    CHOL 195.0 05/23/2016     Lab Results   Component Value Date    TRIG 125 06/09/2017    TRIG 215.0 05/23/2016     Lab Results   Component Value Date    HDL 48 06/09/2017    HDL 51.0 05/23/2016     Lab Results   Component Value Date     06/09/2017    .0 05/23/2016    .0 10/22/2015     04/03/2007       Hepatic Panel Labs  Lab Results   Component Value Date    AST 32.0 08/16/2012     Lab Results   Component Value Date    ALT 49.0 08/16/2012         SOCIAL AND FAMILY HISTORY  Social History   Substance Use Topics     Smoking status: Never Smoker     Smokeless tobacco: Never Used     Alcohol use Yes      Comment: rarely    .  Most Recent Immunizations   Administered Date(s) Administered     HEPA 11/03/2011     HepB 11/03/2011     Influenza (IIV3) PF 10/15/2013     Influenza Vaccine IM 3yrs+ 4 Valent IIV4 10/22/2015     Pneumo Conj 13-V (2010&after) 06/09/2017     Poliovirus, inactivated (IPV) " 07/07/2011     Tdap (Adacel,Boostrix) 05/31/2011     Typhoid IM 10/15/2013, 10/15/2013       CURRENT MEDICATIONS:   Current Outpatient Prescriptions   Medication Sig Dispense Refill     metFORMIN (GLUCOPHAGE-XR) 500 MG 24 hr tablet Take 2 tablets (1,000 mg) by mouth daily (with dinner) 60 tablet 1     insulin glargine (LANTUS) 100 UNIT/ML injection Inject 10 Units Subcutaneous At Bedtime 3 mL 1     insulin pen needle 31G X 6 MM Use once daily or as directed. 100 each 1     [DISCONTINUED] metFORMIN (GLUCOPHAGE-XR) 500 MG 24 hr tablet Take 2 tablets (1,000 mg) by mouth daily (with dinner) 30 tablet 1     [DISCONTINUED] metFORMIN (GLUCOPHAGE-XR) 500 MG 24 hr tablet Take 2 tablets (1,000 mg) by mouth daily (with dinner) 30 tablet 1     [DISCONTINUED] metFORMIN (GLUCOPHAGE-XR) 500 MG 24 hr tablet Take 2 tablets (1,000 mg) by mouth daily (with dinner) 60 tablet 1     propranolol (INDERAL LA) 160 MG 24 hr capsule Take 1 capsule (160 mg) by mouth daily 90 capsule 3     zolpidem (AMBIEN) 10 MG tablet Take 1 tablet (10 mg) by mouth nightly as needed for sleep 30 tablet 2     [DISCONTINUED] metFORMIN (GLUCOPHAGE-XR) 500 MG 24 hr tablet Take 1 po daily with breakfast, and 1 daily with evening meal 180 tablet 4     Ginkgo Biloba (GINKGO PO) Take 1 tablet by mouth daily       Ginseng (ELEUTHERO PO) Take 1 tablet by mouth daily       Aspirin 81 MG TBDP Take 1 tablet by mouth daily         ALLERGIES:   No Known Allergies       ASSESSMENT:  Diabetes: Not at goal.   Dougie will be taking metformin XR 1000 mg once daily.  It will be best in the future to increase this dose to 2000 mg total.  Discussed with patient that when that increase happens, he can take the full 2000 mg at once as long as he is not having GI effects.  Dougie's insulin is not covered well by insurance and he plans to ask his insurance company which are covered-provided him the names of common long acting insulins.  Provided Dougie with a plan to titrate his long acting  insulin once he receives it (1 unit increase each day fasting BG is not at goal.  He is to stop increasing insulin if he reaches 24 units total and is to check in with [facility] at that time).    ASCVD risk: The 10-year ASCVD risk score (Clauseris FERRARI Jr, et al., 2013) is: 8.3%    Values used to calculate the score:      Age: 52 years      Sex: Male      Is Non- : No      Diabetic: Yes      Tobacco smoker: No      Systolic Blood Pressure: 136 mmHg      Is BP treated: Yes      HDL Cholesterol: 48 mg/dL      Total Cholesterol: 176 mg/dL  Dougie is indicated for high intensity statin and his family history puts him at increased risk for a CV event.  Per above, he is somewhat interested in starting this medication.  He would like Dr. Richards's opinion.    HTN: Borderline goal. Propranolol     All medications were reviewed and found to be indicated, effective, safe and convenient unless drug therapy problem identified as described above.    PLAN:     ***    Options for treatment and/or follow-up care were reviewed with the patient. Dougie Nino was engaged and actively involved in the decision making process. He/She verbalized understanding of the options discussed and was satisfied with the final plan.    Patient was provided with written instructions/medication list via AVS.       Medical conditions reviewed: {NUMBER 0-5:21796}    Medications reviewed: {NUMBER 0-5:53342}    DTP identified: {NUMBER 0-5:78151}    Time spent: {TIME:29922}    Level of service:{NUMBER 0-5:69824}

## 2018-01-23 NOTE — NURSING NOTE
"52 year old  Chief Complaint   Patient presents with     Urinary Frequency     Pt says he has to urinate at least hourly every night. Onset about 1 month ago. No other sx.        Blood pressure (!) 144/93, pulse 95, temperature 97.7  F (36.5  C), temperature source Oral, height 6' 0.64\" (184.5 cm), weight 250 lb 0.1 oz (113.4 kg), SpO2 96 %. Body mass index is 33.31 kg/(m^2).  Patient Active Problem List   Diagnosis     Insomnia     Essential hypertension     Preventative health care     Type 2 diabetes mellitus without complication, without long-term current use of insulin (H)       Wt Readings from Last 2 Encounters:   01/23/18 250 lb 0.1 oz (113.4 kg)   06/09/17 265 lb 1.9 oz (120.3 kg)     BP Readings from Last 3 Encounters:   01/23/18 (!) 144/93   06/09/17 138/89   05/23/16 131/85         Current Outpatient Prescriptions   Medication     propranolol (INDERAL LA) 160 MG 24 hr capsule     metFORMIN (GLUCOPHAGE-XR) 500 MG 24 hr tablet     zolpidem (AMBIEN) 10 MG tablet     Ginkgo Biloba (GINKGO PO)     Ginseng (ELEUTHERO PO)     Aspirin 81 MG TBDP     No current facility-administered medications for this visit.        Social History   Substance Use Topics     Smoking status: Never Smoker     Smokeless tobacco: Never Used     Alcohol use Yes      Comment: rarely       Health Maintenance Due   Topic Date Due     EYE EXAM Q1 YEAR  11/05/2016     INFLUENZA VACCINE (SYSTEM ASSIGNED)  09/01/2017     A1C Q6 MO  12/09/2017       No results found for: DENISHA Jama MA  January 23, 2018 8:50 AM    "

## 2018-01-23 NOTE — PROGRESS NOTES
"SUBJECTIVE: Dougie is a 52 year old male who was referred by Deena Pelletier for insulin teaching and a complete medication review.     Today, at Dougie's office visit, his A1c was measured at 14.0.  He was given a prescription for insulin glargine and when he went to the pharmacy, learned that the prescription would cost $200.  He is interested in calling his insurance company to find what long acting insulins are covered.    Prior to today, Dougie notes that he was not taking his metformin as prescribed - he would always take the morning dose, but would more often than not forget the evening dose.  He is confident that if his full dose of metformin is dosed in the morning, he will be able to take it.    Dougie takes propranolol for blood pressure and also notes that it makes him feel \"steady.\"  I explored if he had anxiety symptoms in the past and this is unclear - it may be helping some symptoms of anxiety.  It sounds like he also may have a history of tachycardia.    We had a discussion about statins.  Dougie is somewhat open to starting a statin, but was less convinced, actually, after we looked at the [place] decision aid regarding statins which showed that he has a 8/100 risk of an event in ten years that would be decreased to 5/100 chance if he were on a high intensity statin.    Environmental factors that impact patient: Dougie travels quite a bit for his work with [employer].      Patient reports difficulty with taking twice daily doses of medications per above.    Review of Potential Medication Side Effects: No GI side effects with metformin currently, some \"slowing\" with propranolol, but Dougie feels pretty comfortable on this medication.    Patient Active Problem List   Diagnosis     Insomnia     Essential hypertension     Preventative health care     Type 2 diabetes mellitus without complication, without long-term current use of insulin (H)        OBJECTIVE:     CLAIRE-7 SCORE 1/23/2018   Total Score 0       PHQ-9 SCORE " "1/23/2018   Total Score 0         VITALS:  BP Readings from Last 3 Encounters:   01/23/18 136/88   01/23/18 130/85   06/09/17 138/89           Weight:   Wt Readings from Last 1 Encounters:   01/23/18 253 lb (114.8 kg)       Height:   Ht Readings from Last 1 Encounters:   01/23/18 6' 1\" (185.4 cm)       LABORATORY VALUES:   Last A1C:    Lab Results   Component Value Date    A1C 14.0 01/23/2018   .    Last Basic Metabolic Panel:  Lab Results   Component Value Date    .0 01/23/2018      Lab Results   Component Value Date    POTASSIUM 5.0 01/23/2018     Lab Results   Component Value Date    CHLORIDE 100.0 01/23/2018     Lab Results   Component Value Date    JUHI 9.5 01/23/2018     Lab Results   Component Value Date    CO2 28.0 01/23/2018     Lab Results   Component Value Date    BUN 18.0 01/23/2018     Lab Results   Component Value Date    CR 0.9 01/23/2018     Lab Results   Component Value Date    .0 01/23/2018       Lipid Panel Labs  Lab Results   Component Value Date    CHOL 176 06/09/2017    CHOL 195.0 05/23/2016     Lab Results   Component Value Date    TRIG 125 06/09/2017    TRIG 215.0 05/23/2016     Lab Results   Component Value Date    HDL 48 06/09/2017    HDL 51.0 05/23/2016     Lab Results   Component Value Date     06/09/2017    .0 05/23/2016    .0 10/22/2015     04/03/2007       Hepatic Panel Labs  Lab Results   Component Value Date    AST 32.0 08/16/2012     Lab Results   Component Value Date    ALT 49.0 08/16/2012         SOCIAL AND FAMILY HISTORY  Social History   Substance Use Topics     Smoking status: Never Smoker     Smokeless tobacco: Never Used     Alcohol use Yes      Comment: rarely    .  Most Recent Immunizations   Administered Date(s) Administered     HEPA 11/03/2011     HepB 11/03/2011     Influenza (IIV3) PF 10/15/2013     Influenza Vaccine IM 3yrs+ 4 Valent IIV4 10/22/2015     Pneumo Conj 13-V (2010&after) 06/09/2017     Poliovirus, inactivated (IPV) " 07/07/2011     Tdap (Adacel,Boostrix) 05/31/2011     Typhoid IM 10/15/2013, 10/15/2013       CURRENT MEDICATIONS:   Current Outpatient Prescriptions   Medication Sig Dispense Refill     metFORMIN (GLUCOPHAGE-XR) 500 MG 24 hr tablet Take 2 tablets (1,000 mg) by mouth daily (with dinner) 60 tablet 1     insulin glargine (LANTUS) 100 UNIT/ML injection Inject 10 Units Subcutaneous At Bedtime 3 mL 1     insulin pen needle 31G X 6 MM Use once daily or as directed. 100 each 1     [DISCONTINUED] metFORMIN (GLUCOPHAGE-XR) 500 MG 24 hr tablet Take 2 tablets (1,000 mg) by mouth daily (with dinner) 30 tablet 1     [DISCONTINUED] metFORMIN (GLUCOPHAGE-XR) 500 MG 24 hr tablet Take 2 tablets (1,000 mg) by mouth daily (with dinner) 30 tablet 1     [DISCONTINUED] metFORMIN (GLUCOPHAGE-XR) 500 MG 24 hr tablet Take 2 tablets (1,000 mg) by mouth daily (with dinner) 60 tablet 1     propranolol (INDERAL LA) 160 MG 24 hr capsule Take 1 capsule (160 mg) by mouth daily 90 capsule 3     zolpidem (AMBIEN) 10 MG tablet Take 1 tablet (10 mg) by mouth nightly as needed for sleep 30 tablet 2     [DISCONTINUED] metFORMIN (GLUCOPHAGE-XR) 500 MG 24 hr tablet Take 1 po daily with breakfast, and 1 daily with evening meal 180 tablet 4     Ginkgo Biloba (GINKGO PO) Take 1 tablet by mouth daily       Ginseng (ELEUTHERO PO) Take 1 tablet by mouth daily       Aspirin 81 MG TBDP Take 1 tablet by mouth daily         ALLERGIES:   No Known Allergies       ASSESSMENT:  Diabetes: Not at goal.   Dougie will be taking metformin XR 1000 mg once daily.  It will be best in the future to increase this dose to 2000 mg total.  Discussed with patient that when that increase happens, he can take the full 2000 mg at once as long as he is not having GI effects.  Dougie's insulin is not covered well by insurance and he plans to ask his insurance company which are covered-provided him the names of common long acting insulins.  Provided Dougie with a plan to titrate his long acting  insulin once he receives it (1 unit increase each day fasting BG is not at goal.  He is to stop increasing insulin if he reaches 24 units total and is to check in with [facility] at that time).    ASCVD risk: The 10-year ASCVD risk score (Clauseris FERRARI Jr, et al., 2013) is: 8.3%    Values used to calculate the score:      Age: 52 years      Sex: Male      Is Non- : No      Diabetic: Yes      Tobacco smoker: No      Systolic Blood Pressure: 136 mmHg      Is BP treated: Yes      HDL Cholesterol: 48 mg/dL      Total Cholesterol: 176 mg/dL  Dougie is indicated for high intensity statin and his family history puts him at increased risk for a CV event.  Per above, he is somewhat interested in starting this medication.  He would like Dr. Richards's opinion and this will be important.    HTN: Borderline goal. Propranolol has some benefits (may help with higher heart rate, and anxiety symptoms), but has some draw backs (masks hypoglycemia, not first line agent for BP).  Discussed pros and cons with patient and he would like to continue propranolol.    All medications were reviewed and found to be indicated, effective, safe and convenient unless drug therapy problem identified as described above.    PLAN:   - Dougie will call insurance to learn about their coverage of basal insulins and will call [facility] once he learns more.  He is certainly welcome to call this writer for help as well.  - Will request Deena Pelletier send glucometer and supplies since patient's current strips are , glucometer quite old.  - Educated Dougie on insulin use with demonstration pen. Once he is able to start basal insulin,  advised to increase 1 unit every day that fasting BG is >130 and to stop if he reaches 24 units and make sure to f/u with [facility] for further titration.  - Dougie will discuss initiating a statin at next visit with Dr. Richards.  Would recommend atorvastatin 80 mg daily.    Options for treatment  and/or follow-up care were reviewed with the patient. Dougie Nino was engaged and actively involved in the decision making process. He/She verbalized understanding of the options discussed and was satisfied with the final plan.    Patient was provided with written instructions/medication list via AVS.       Medical conditions reviewed: 3    Medications reviewed: 4    DTP identified: 2    Time spent: 60 minutes    Level of service: 3, nc

## 2018-01-23 NOTE — Clinical Note
Dane Mancini and Deena,  I enjoyed meeting with Dougie today.  See note for details.  Dougie told me the insulin isn't covered.  He will call insurance and find out which basal insulins are covered and will send a message to Deena with this info.  Deena, can you order glucometer, test strips, lancing device and lancets.  We discovered Dougie's supplies are quite old with  test strips.  Live, we had a brief discussion about statins.  Dougie is on the fence and wants your opinion at his next visit.  Let me know how I can help with Dougie in the future.

## 2018-01-23 NOTE — PATIENT INSTRUCTIONS
Diabetic education was ordered.  It is important to start checking your blood sugars and learn more about diet expecially when you are on the road traveling.    We increased your metformin and changed to a long acting formulation.    If you are able to make an appointment with the diabetic educator you can follow up with Dr. Richards in 3 months.    Your blood pressure was elevated today but it was improved on recheck.  For now continue on your current dose of medication. Check your blood pressures once weekly and if consistently above 130/90 we should consider additional treatment for your blood pressure.   You can manage with lifestyle modifications with a DASH diet and I have enclosed information on that.    Consider taking a baby aspirin once daily if you do not already do so.    Please let me know if you have any questions.  Thank you for allowing me to participate in your care.  It was my pleasure meeting you.  Lisa Pelletier PA-C      Eating Heart-Healthy Food: Using the DASH Plan    Eating for your heart doesn t have to be hard or boring. You just need to know how to make healthier choices. The DASH eating plan has been developed to help you do just that. DASH stands for Dietary Approaches to Stop Hypertension. It is a plan that has been proven to be healthier for your heart and to lower your risk for high blood pressure. It can also help lower your risk for cancer, heart disease, osteoporosis, and diabetes.  Choosing from each food group  Choose foods from each of the food groups below each day. Try to get the recommended number of servings for each food group. The serving numbers are based on a diet of 2,000 calories a day. Talk to your doctor if you re unsure about your calorie needs. Along with getting the correct servings, the DASH plan also recommends a sodium intake less than 2,300 mg per day.        Grains  Servings: 6 to 8 a day  A serving is:    1 slice bread    1 ounce dry cereal    Half a cup  cooked rice, pasta or cereal  Best choices: Whole grains and any grains high in fiber. Vegetables  Servings: 4 to 5 a day  A serving is:    1 cup raw leafy vegetable    Half a cup cut-up raw or cooked vegetable    Half a cup vegetable juice  Best choices: Fresh or frozen vegetables prepared without added salt or fat.   Fruits  Servings: 4 to 5 a day  A serving is:    1 medium fruit    One-quarter cup dried fruit    Half a cup fresh, frozen, or canned fruit    Half a cup of 100% fruit juices  Best choices: A variety of fresh fruits of different colors. Whole fruits are a better choice than fruit juices. Low-fat or fat-free dairy  Servings: 2 to 3 a day  A serving is:    1 cup milk    1 cup yogurt    One and a half ounces cheese  Best choices: Skim or 1% milk, low-fat or fat-free yogurt or buttermilk, and low-fat cheeses.         Lean meats, poultry, fish  Servings: 6 or fewer a day  A serving is:    1 ounce cooked meats, poultry, or fish    1 egg  Best choices: Lean poultry and fish. Trim away visible fat. Broil, grill, roast, or boil instead of frying. Remove skin from poultry before eating. Limit how much red meat you eat.  Nuts, seeds, beans  Servings: 4 to 5 a week  A serving is:    One-third cup nuts (one and a half ounces)    2 tablespoons nut butter or seeds    Half a cup cooked dry beans or legumes  Best choices: Dry roasted nuts with no salt added, lentils, kidney beans, garbanzo beans, and whole hong beans.   Fats and oils  Servings: 2 to 3 a day  A serving is:    1 teaspoon vegetable oil    1 teaspoon soft margarine    1 tablespoon mayonnaise    2 tablespoons salad dressing  Best choices: Nut and vegetable oils (nontropical vegetable oils), such as olive and canola oil. Sweets  Servings: 5 a week or fewer  A serving is:    1 tablespoon sugar, maple syrup, or honey    1 tablespoon jam or jelly    1 half-ounce jelly beans (about 15)    1 cup lemonade  Best choices: Dried fruit can be a satisfying sweet.  Choose low-fat sweets. And watch your serving sizes!      For more on the DASH eating plan, visit:  www.nhlbi.nih.gov/health/health-topics/topics/dash   Date Last Reviewed: 6/1/2016 2000-2017 [employer]. [address] [place] [address], [place] [address]. All rights reserved. This information is not intended as a substitute for professional medical care. Always follow your healthcare professional's instructions.

## 2018-01-23 NOTE — NURSING NOTE
"52 year old  Chief Complaint   Patient presents with     Consult     Diabetes consult       Blood pressure 136/88, pulse 89, temperature 98  F (36.7  C), temperature source Oral, resp. rate 14, height 6' 1\" (185.4 cm), weight 253 lb (114.8 kg), SpO2 97 %. Body mass index is 33.38 kg/(m^2).  BP completed using cuff size: regular    Patient Active Problem List   Diagnosis     Insomnia     Essential hypertension     Preventative health care     Type 2 diabetes mellitus without complication, without long-term current use of insulin (H)       Wt Readings from Last 2 Encounters:   01/23/18 253 lb (114.8 kg)   01/23/18 250 lb 0.1 oz (113.4 kg)     BP Readings from Last 3 Encounters:   01/23/18 136/88   01/23/18 130/85   06/09/17 138/89       No Known Allergies    Current Outpatient Prescriptions   Medication     metFORMIN (GLUCOPHAGE-XR) 500 MG 24 hr tablet     insulin glargine (LANTUS) 100 UNIT/ML injection     insulin pen needle 31G X 6 MM     [DISCONTINUED] metFORMIN (GLUCOPHAGE-XR) 500 MG 24 hr tablet     [DISCONTINUED] metFORMIN (GLUCOPHAGE-XR) 500 MG 24 hr tablet     [DISCONTINUED] metFORMIN (GLUCOPHAGE-XR) 500 MG 24 hr tablet     propranolol (INDERAL LA) 160 MG 24 hr capsule     zolpidem (AMBIEN) 10 MG tablet     [DISCONTINUED] metFORMIN (GLUCOPHAGE-XR) 500 MG 24 hr tablet     Ginkgo Biloba (GINKGO PO)     Ginseng (ELEUTHERO PO)     Aspirin 81 MG TBDP     No current facility-administered medications for this visit.        Social History   Substance Use Topics     Smoking status: Never Smoker     Smokeless tobacco: Never Used     Alcohol use Yes      Comment: rarely       Health Maintenance Due   Topic Date Due     EYE EXAM Q1 YEAR  11/05/2016     INFLUENZA VACCINE (SYSTEM ASSIGNED)  09/01/2017     A1C Q6 MO  12/09/2017       No results found for: PAP    PHQ-2 ( 1999 Pfizer) 1/23/2018 6/9/2017   Q1: Little interest or pleasure in doing things 0 0   Q2: Feeling down, depressed or hopeless 0 0   PHQ-2 Score 0 0 "       PHQ-9 SCORE 1/23/2018   Total Score 0       CLAIRE-7 SCORE 1/23/2018   Total Score 0       No flowsheet data found.    Tahira Michael CMA  January 23, 2018 1:40 PM

## 2018-01-23 NOTE — PATIENT INSTRUCTIONS
"Goal fasting blood sugar:     Each day that your fasting blood sugar is above 130, increase insulin by 1 unit.  If fasting blood sugar ever falls below 130, stop increasing insulin. Follow up with [facility] in 2 weeks. Stop increasing insulin in 2 weeks.     Low blood sugar of concern is below 70. If this occurs, you should \"correct\" with half a cup of juice.  You can also correct with 15 grams of glucose tablets.    Ask insurance company what \"basal\" or \"long acting\" insulin they cover.  Common long acting insulins are:    Insulin glargine  Insulin degludec  Insulin detemir    "

## 2018-01-23 NOTE — PROGRESS NOTES
SUBJECTIVE:   Dougie Nino is a 52 year old male who presents to clinic today for the following health issues:      Complains of urinary frequency for the past month: Admits to being non-compliant with his metformin.  Take one tab daily though prescribed as twice daily.  Increased thirst.  Nocturia several times nightly.  No pedal edema. Weight loss over the past 6 months without change in activity or diet. Chart review shows he HAS been prescribed metformin  BID.    Walks daily but nothing else. No side effects with the metformin--just has trouble taking regularly.  Has had an eye exam this year and per patient history vision has improved over the past year or two but he does say he has times when he does not see as well.    Wt Readings from Last 2 Encounters:   01/23/18 250 lb 0.1 oz (113.4 kg)   06/09/17 265 lb 1.9 oz (120.3 kg)       Diabetes Follow-up    Patient is checking blood sugars: not at all    Diabetic concerns: None and other - noncomplinace     Symptoms of hypoglycemia (low blood sugar): none     Paresthesias (numbness or burning in feet) or sores: No     Date of last diabetic eye exam: less than one year ago per patient history.  Would like him to sign KEAGAN.    Hyperlipidemia Follow-Up    Rate your low fat/cholesterol diet?: not monitoring fat    Taking statin?  No    Other lipid medications/supplements?:  none    Hypertension Follow-up:  Denies symptoms associated with elevated blood pressure including blurred vision, headache, chest pain, heart palpitations, pedal edema, SOB.    Does not check BP at home. He does not take ACE/ARB.  Had been on lisinopril in the past but did not like the side effects.    BP Readings from Last 6 Encounters:   01/23/18 130/85   06/09/17 138/89   05/23/16 131/85   10/22/15 129/84   12/17/14 143/86   07/09/14 132/88         Outpatient blood pressures are not being checked.    Low Salt Diet: not monitoring salt    BP Readings from Last 2 Encounters:   01/23/18  130/85   06/09/17 138/89     Hemoglobin A1C (%)   Date Value   01/23/2018 14.0 (H)   06/09/2017 7.3 (H)     LDL Cholesterol Calculated (mg/dL)   Date Value   06/09/2017 103 (H)   04/03/2007 127     LDL Cholesterol Direct (no units)   Date Value   05/23/2016 101.0   10/22/2015 126.0         Amount of exercise or physical activity: walks daily but no structured exercise.    Problems taking medications regularly: Yes,  problems remembering to take    Medication side effects: none    Diet: regular (no restrictions)        Problem list and histories reviewed & adjusted, as indicated.  Additional history: as documented    Patient Active Problem List   Diagnosis     Insomnia     Essential hypertension     Preventative health care     Type 2 diabetes mellitus without complication, without long-term current use of insulin (H)     Past Surgical History:   Procedure Laterality Date     WRIST SURGERY Right ~1990       Social History   Substance Use Topics     Smoking status: Never Smoker     Smokeless tobacco: Never Used     Alcohol use Yes      Comment: rarely     Family History   Problem Relation Age of Onset     Glaucoma No family hx of      Macular Degeneration No family hx of          Current Outpatient Prescriptions   Medication Sig Dispense Refill     metFORMIN (GLUCOPHAGE-XR) 500 MG 24 hr tablet Take 2 tablets (1,000 mg) by mouth daily (with dinner) 60 tablet 1     insulin glargine (LANTUS) 100 UNIT/ML injection Inject 10 Units Subcutaneous At Bedtime 3 mL 1     insulin pen needle 31G X 6 MM Use once daily or as directed. 100 each 1     propranolol (INDERAL LA) 160 MG 24 hr capsule Take 1 capsule (160 mg) by mouth daily 90 capsule 3     zolpidem (AMBIEN) 10 MG tablet Take 1 tablet (10 mg) by mouth nightly as needed for sleep 30 tablet 2     Ginkgo Biloba (GINKGO PO) Take 1 tablet by mouth daily       Ginseng (ELEUTHERO PO) Take 1 tablet by mouth daily       Aspirin 81 MG TBDP Take 1 tablet by mouth daily       No Known  "Allergies      Reviewed and updated as needed this visit by clinical staffTobacco  Allergies  Meds  Problems  Med Hx  Surg Hx  Fam Hx  Soc Hx        Reviewed and updated as needed this visit by Provider  Tobacco  Allergies  Meds  Problems  Med Hx  Surg Hx  Fam Hx  Soc Hx          ROS:  Constitutional, HEENT, cardiovascular, pulmonary, gi and gu systems are negative, except as otherwise noted.      OBJECTIVE:   /85 (BP Location: Right arm, Patient Position: Chair, Cuff Size: Adult Regular)  Pulse 95  Temp 97.7  F (36.5  C) (Oral)  Ht 6' 0.64\" (184.5 cm)  Wt 250 lb 0.1 oz (113.4 kg)  SpO2 96%  BMI 33.31 kg/m2  Body mass index is 33.31 kg/(m^2).  GENERAL: healthy, alert and no distress  EYES: Eyes grossly normal to inspection, PERRL and conjunctivae and sclerae normal  HENT: ear canals and TM's normal, nose and mouth without ulcers or lesions.  Mucous membranes moist  NECK: no adenopathy, no asymmetry, masses, or scars and thyroid normal to palpation.  No bruits  RESP: lungs clear to auscultation - no rales, rhonchi or wheezes  CV: regular rate and rhythm, normal S1 S2, no S3 or S4, no murmur, click or rub, no peripheral edema and peripheral pulses strong  MS: no gross musculoskeletal defects noted, no clubbing, edema or cyanosis of extremities.  Pulses = and appropriate bilaterally to DP and PT  SKIN: no suspicious lesions or rashes    Diagnostic Test Results:  Results for orders placed or performed in visit on 01/23/18 (from the past 24 hour(s))   Urinalysis ([place])   Result Value Ref Range    Specific Gravity Urine 1.010 1.005 - 1.030    pH Urine 6.0 4.5 - 8.0    Leukocyte Esterase UR Negative Negative    Nitrite Urine Negative Negative    Protein UR Negative Negative    Glucose Urine 2+ (A) Negative    Ketones Urine Negative Negative    Urobilinogen mg/dL 0.2 E.U./dL 0.2 E.U./dL    Bilirubin UR Negative Negative    Blood UR Negative Negative   Basic Metabolic Panel ([place])   Result " Value Ref Range    Glucose 541.0 (HH) 60.0 - 109.0 mg/dL    Urea Nitrogen 18.0 7.0 - 30.0 mg/dL    Calcium 9.5 8.5 - 10.4 mg/dL    Creatinine 0.9 0.8 - 1.5 mg/dL    eGFR Calculated (Non Black Reference) 94.6 >60.0    eGFR Calculated (Black Reference) 114.4 >60.0    Sodium 137.0 133.0 - 144.0 mmol/L    Potassium 5.0 3.4 - 5.3 mmol/L    Chloride 100.0 94.0 - 109.0 mmol/L    Carbon Dioxide 28.0 20.0 - 32.0 mmol/L   Hemoglobin A1c ([place])   Result Value Ref Range    Hemoglobin A1C 14.0 (H) 4.1 - 5.7 %       ASSESSMENT/PLAN:      Diagnosis Comments   1. Type 2 diabetes mellitus without complication, without long-term current use of insulin (H)  Hemoglobin A1c ([place]), DIABETES EDUCATOR REFERRAL, metFORMIN (GLUCOPHAGE-XR) 500 MG 24 hr tablet, insulin glargine (LANTUS) 100 UNIT/ML injection, insulin pen needle 31G X 6 MM  Poor control and poor compliance  Discussed the importance of taking medication regularly and to start checking his blood sugars.  He is taking this seriously and is willing to do whatever he needs to do. He admits he has been in denial about his diabetes and his current lifestyle with work makes it very difficult to take care of himself.    Poor control    2. Urinary frequency  Urinalysis ([place]), Basic Metabolic Panel ([place]), Hemoglobin A1c ([place]), TSH with free T4 reflex    3. Essential hypertension  Basic Metabolic Panel ([place])   Fair control/ DASH diet info sheet given and discussed.       Diabetic education was ordered.  It is important to start checking your blood sugars and learn more about diet especially when you are on the road traveling.    We increased your metformin and changed to a long acting formulation.    If you are able to make an appointment with the diabetic educator you can follow up with Dr. Richards in 3 months.    Your blood pressure was elevated today but it was improved on recheck.  For now continue on your current dose of medication. Check your blood  pressures once weekly and if consistently above 130/90 we should consider additional treatment for your blood pressure.   You can manage with lifestyle modifications with a DASH diet and I have enclosed information on that.    Consider taking a baby aspirin once daily if you do not already do so.  DASH diet sheet given--see PI.      Deena Pelletier PA-C  [facility]      ADDENDUM:  Critical labs glucose 541 and HGB A1C 14.0.  Called and spoke to patient/results discussed.  Recommend start basal insulin today/RX sent along with needles.  Spoke with Mikaela Posadas, [place].  She will meet with patient today for insulin teaching as well as review of all meds/ MTM.  He is to bring glucose monitor and all supplies to appointment. OK to increase dose as directed.  Elected to start low dose-10 units due to patients travel schedule, historic compliance issues and due to propranolol.  Will follow up with diabetic educator as soon as he can get an appointment and with PCP in 2 weeks. Encouraged he call if he has any questions.

## 2018-01-24 DIAGNOSIS — Z79.4 TYPE 2 DIABETES MELLITUS WITH HYPERGLYCEMIA, WITH LONG-TERM CURRENT USE OF INSULIN (H): Primary | ICD-10-CM

## 2018-01-24 DIAGNOSIS — E11.65 TYPE 2 DIABETES MELLITUS WITH HYPERGLYCEMIA, WITH LONG-TERM CURRENT USE OF INSULIN (H): Primary | ICD-10-CM

## 2018-01-24 RX ORDER — BLOOD SUGAR DIAGNOSTIC
1 STRIP MISCELLANEOUS 2 TIMES DAILY
Qty: 1 KIT | Refills: 1 | Status: SHIPPED | OUTPATIENT
Start: 2018-01-24

## 2018-01-24 ASSESSMENT — ANXIETY QUESTIONNAIRES: GAD7 TOTAL SCORE: 0

## 2018-01-26 RX ORDER — LANCETS 28 GAUGE
1 EACH MISCELLANEOUS DAILY
Qty: 100 EACH | Refills: 1 | Status: CANCELLED | OUTPATIENT
Start: 2018-01-26

## 2018-02-13 ENCOUNTER — TELEPHONE (OUTPATIENT)
Dept: PHARMACY | Facility: CLINIC | Age: 53
End: 2018-02-13

## 2018-02-13 DIAGNOSIS — E11.9 TYPE 2 DIABETES MELLITUS WITHOUT COMPLICATION, WITHOUT LONG-TERM CURRENT USE OF INSULIN (H): ICD-10-CM

## 2018-02-13 DIAGNOSIS — Z79.4 TYPE 2 DIABETES MELLITUS WITHOUT COMPLICATION, WITH LONG-TERM CURRENT USE OF INSULIN (H): Primary | ICD-10-CM

## 2018-02-13 DIAGNOSIS — E11.9 TYPE 2 DIABETES MELLITUS WITHOUT COMPLICATION, WITH LONG-TERM CURRENT USE OF INSULIN (H): Primary | ICD-10-CM

## 2018-02-13 NOTE — TELEPHONE ENCOUNTER
Called patient to follow up on insulin start.  Dougie started on insulin NPH 10 units BID 4 days ago.  He is very happy with the changes in his blood glucose since starting insulin.  He has also been monitoring his BG more carefully and has made many positive changes in his diet which he is proud of. He does not have his exact BG numbers available, but notes in the morning, his fasting readings are generally around 140.  He is also taking his BG after meals and notes that the readings are between 150-197.  The lowest reading he has seen is 105.  No signs/syptoms hypoglycemia.    Reviewed goal fasting of  and goal 2 hour postprandial of <180.  Discussed option of continuing to monitor since BG is close to goal or very slightly increasing insulin dose.  Dougie would like to increase his insulin dose. New dose of insulin NPH will be 12 units in the morning and 10 units at night.      Dougie has an appointment with Dr. Richards in one week.  He will print out his blood glucose readings to bring to that appointment.  He will contact this writer if he has any questions in the interim. This writer will have a phone call with Dougie on Friday, February 23 to continue to monitor BG.

## 2018-02-20 ENCOUNTER — OFFICE VISIT (OUTPATIENT)
Dept: FAMILY MEDICINE | Facility: CLINIC | Age: 53
End: 2018-02-20
Payer: COMMERCIAL

## 2018-02-20 VITALS
TEMPERATURE: 98.6 F | HEART RATE: 70 BPM | SYSTOLIC BLOOD PRESSURE: 145 MMHG | WEIGHT: 247 LBS | OXYGEN SATURATION: 98 % | DIASTOLIC BLOOD PRESSURE: 83 MMHG | HEIGHT: 73 IN | BODY MASS INDEX: 32.74 KG/M2

## 2018-02-20 DIAGNOSIS — Z79.4 CONTROLLED TYPE 2 DIABETES MELLITUS WITHOUT COMPLICATION, WITH LONG-TERM CURRENT USE OF INSULIN (H): Primary | ICD-10-CM

## 2018-02-20 DIAGNOSIS — E11.9 CONTROLLED TYPE 2 DIABETES MELLITUS WITHOUT COMPLICATION, WITH LONG-TERM CURRENT USE OF INSULIN (H): Primary | ICD-10-CM

## 2018-02-20 DIAGNOSIS — G47.00 INSOMNIA, UNSPECIFIED TYPE: ICD-10-CM

## 2018-02-20 DIAGNOSIS — R35.0 URINARY FREQUENCY: ICD-10-CM

## 2018-02-20 LAB
BILIRUBIN UR: NEGATIVE
BLOOD UR: NEGATIVE
GLUCOSE URINE: ABNORMAL
KETONES UR QL: ABNORMAL
LEUKOCYTE ESTERASE UR: NEGATIVE
NITRITE UR QL STRIP: NEGATIVE
PH UR STRIP: 6 [PH] (ref 5–7)
PROTEIN UR: NEGATIVE
SP GR UR STRIP: 1.02
UROBILINOGEN UR STRIP-ACNC: ABNORMAL

## 2018-02-20 RX ORDER — ZOLPIDEM TARTRATE 10 MG/1
10 TABLET ORAL
Qty: 90 TABLET | Refills: 1 | Status: SHIPPED | OUTPATIENT
Start: 2018-02-20 | End: 2018-09-12

## 2018-02-20 ASSESSMENT — PAIN SCALES - GENERAL: PAINLEVEL: NO PAIN (0)

## 2018-02-20 NOTE — MR AVS SNAPSHOT
"              After Visit Summary   2/20/2018    Dougie Nino    MRN: [id number]           Patient Information     Date Of Birth          1965        Visit Information        Provider Department      2/20/2018 4:00 PM Live Richards MD [facility]        Today's Diagnoses     Urinary frequency    -  1    Insomnia, unspecified type           Follow-ups after your visit        Who to contact     Please call your clinic at [phone] to:    Ask questions about your health    Make or cancel appointments    Discuss your medicines    Learn about your test results    Speak to your doctor            Additional Information About Your Visit        MyChart Information     [employer] gives you secure access to your electronic health record. If you see a primary care provider, you can also send messages to your care team and make appointments. If you have questions, please call your primary care clinic.  If you do not have a primary care provider, please call [phone] and they will assist you.      [employer] is an electronic gateway that provides easy, online access to your medical records. With [employer], you can request a clinic appointment, read your test results, renew a prescription or communicate with your care team.     To access your existing account, please contact your [facility] or call [phone] for assistance.        Care EveryWhere ID     This is your Care EveryWhere ID. This could be used by other organizations to access your [facility] medical records  [id number]        Your Vitals Were     Pulse Temperature Height Pulse Oximetry BMI (Body Mass Index)       70 98.6  F (37  C) (Temporal) 6' 0.64\" (184.5 cm) 98% 32.91 kg/m2        Blood Pressure from Last 3 Encounters:   02/20/18 145/83   01/23/18 136/88   01/23/18 130/85    Weight from Last 3 Encounters:   02/20/18 247 lb (112 kg)   01/23/18 253 lb (114.8 kg)   01/23/18 250 lb 0.1 oz (113.4 kg)              We " Performed the Following     Urinalysis ([place])          Where to get your medicines      Some of these will need a paper prescription and others can be bought over the counter.  Ask your nurse if you have questions.     Bring a paper prescription for each of these medications     zolpidem 10 MG tablet          Primary Care Provider Office Phone # Fax #    Live Richards [phone]        [address] [place] [id number]        Equal Access to Services     NAVNEET ARAUJO : Hadii aad ku hadasho Soomaali, waaxda luqadaha, qaybta kaalmada adeegyada, waxay idiin hayaan adeeg kharash lashayy . So [facility] [phone].    ATENCIÓN: Si johnla espcarlitos, tiene a dutta disposición servicios gratuitos de asistencia lingüística. [place] al [phone].    We comply with applicable federal civil rights laws and [place] laws. We do not discriminate on the basis of race, color, national origin, age, disability, sex, sexual orientation, or gender identity.            Thank you!     Thank you for choosing [facility]  for your care. Our goal is always to provide you with excellent care. Hearing back from our patients is one way we can continue to improve our services. Please take a few minutes to complete the written survey that you may receive in the mail after your visit with us. Thank you!             Your Updated Medication List - Protect others around you: Learn how to safely use, store and throw away your medicines at www.disposemymeds.org.          This list is accurate as of 2/20/18  5:24 PM.  Always use your most recent med list.                   Brand Name Dispense Instructions for use Diagnosis    Aspirin 81 MG Tbdp      Take 1 tablet by mouth daily        blood glucose monitoring meter device kit    no brand specified    1 kit    Use to test blood sugar 2 times daily or as directed.    Type 2 diabetes mellitus with hyperglycemia, with long-term current use of insulin (H)       blood glucose  "monitoring test strip    no brand specified    100 strip    Use to test blood sugars 2 times daily or as directed    Type 2 diabetes mellitus with hyperglycemia, with long-term current use of insulin (H)       ELEUTHERO PO      Take 1 tablet by mouth daily        GINKGO PO      Take 1 tablet by mouth daily        insulin  UNIT/ML injection    HumuLIN N/NovoLIN N    10 mL    Inject 12 Units Subcutaneous in the morning and 10 units subcutaneously in the evening    Type 2 diabetes mellitus without complication, without long-term current use of insulin (H)       insulin pen needle 31G X 6 MM     100 each    Use once daily or as directed.    Type 2 diabetes mellitus without complication, without long-term current use of insulin (H)       insulin syringe-needle U-100 29G X 1/2\" 1 ML     100 each    Use 2 syringes daily or as directed.    Type 2 diabetes mellitus without complication, without long-term current use of insulin (H)       metFORMIN 500 MG 24 hr tablet    GLUCOPHAGE-XR    240 tablet    Take 2 tablets (1,000 mg) by mouth 2 times daily (with meals)    Type 2 diabetes mellitus without complication, without long-term current use of insulin (H)       MULTI-LANCET DEVICE 2 Kit     1 kit    1 lancet 2 times daily As needed to check blood sugar levels    Type 2 diabetes mellitus with hyperglycemia, with long-term current use of insulin (H)       propranolol 160 MG 24 hr capsule    INDERAL LA    90 capsule    Take 1 capsule (160 mg) by mouth daily    Essential hypertension, benign       zolpidem 10 MG tablet    AMBIEN    90 tablet    Take 1 tablet (10 mg) by mouth nightly as needed for sleep    Insomnia, unspecified type         "

## 2018-02-20 NOTE — NURSING NOTE
"52 year old  Chief Complaint   Patient presents with     Follow Up For     F/U for diabetes diagnosis.       Blood pressure 145/83, pulse 70, temperature 98.6  F (37  C), temperature source Temporal, height 6' 0.64\" (184.5 cm), weight 247 lb (112 kg), SpO2 98 %. Body mass index is 32.91 kg/(m^2).  Patient Active Problem List   Diagnosis     Insomnia     Essential hypertension     Preventative health care     Type 2 diabetes mellitus without complication (H)       Wt Readings from Last 2 Encounters:   02/20/18 247 lb (112 kg)   01/23/18 253 lb (114.8 kg)     BP Readings from Last 3 Encounters:   02/20/18 145/83   01/23/18 136/88   01/23/18 130/85         Current Outpatient Prescriptions   Medication     insulin NPH (HUMULIN N/NOVOLIN N) 100 UNIT/ML injection     metFORMIN (GLUCOPHAGE-XR) 500 MG 24 hr tablet     insulin syringe-needle U-100 29G X 1/2\" 1 ML     blood glucose monitoring (NO BRAND SPECIFIED) meter device kit     blood glucose monitoring (NO BRAND SPECIFIED) test strip     Lancets Misc. (MULTI-LANCET DEVICE 2) KIT     insulin pen needle 31G X 6 MM     propranolol (INDERAL LA) 160 MG 24 hr capsule     zolpidem (AMBIEN) 10 MG tablet     Ginkgo Biloba (GINKGO PO)     Ginseng (ELEUTHERO PO)     Aspirin 81 MG TBDP     No current facility-administered medications for this visit.        Social History   Substance Use Topics     Smoking status: Never Smoker     Smokeless tobacco: Never Used     Alcohol use Yes      Comment: rarely       Health Maintenance Due   Topic Date Due     EYE EXAM Q1 YEAR  11/05/2016     INFLUENZA VACCINE (SYSTEM ASSIGNED)  09/01/2017       No results found for: DENISHA Jama MA  February 20, 2018 4:16 PM    "

## 2018-02-21 NOTE — PROGRESS NOTES
CHIEF COMPLAINT:  Diabetic followup.      HISTORY OF PRESENT ILLNESS:  Dougie is a 52 year old who was diagnosed in late January with type 2 diabetes.  His blood sugar was over 500, although he was not in ketoacidosis.  He had a great deal of sugar spilling over into his urine.  He was started on metformin 500 mg XR, 1000 mEq daily, and this was quickly increased to 2000 (500 mg, 2 tablets taken together twice daily).  It was clear that that was not quite doing the trick, so he has been started on NPH (Novolin N) insulin.  He is doing 12 units in the morning and 10 units in the evening.  He has already made an adjustment.      He is monitoring his sugars throughout the day.  He is using a device called [employer], and it is supplied through [employer].  It has a wireless connection, so it uplinks to his phone.  He is able to free test strips.  He is very happy with how the numbers look, and he showed me a graph.  Sugars have gone from being about 500 to less than 200 within the last 2 weeks.  He is clearly heading in the right direction.      Insulin was just started last week.  Therefore, we will think about 05/20, 3 months from now, as the week to come back and have some labs rechecked.  We will do another A1c, check a lipid panel (his last LDL cholesterol was just over 100) as well as urine microalbumin.  Everything else has been tested.        He is dealing with some vision changes.  There is no doubt that now that his sugars have decreased markedly, he will need a new prescription.  However, he can make do with his current glasses until he sees me in May.  At that point, once we know that things have stabilized, it would be a good time to get his eyes reexamined and get a new prescription.      He has a little urinary frequency still at this point.  Urine was obtained before he entered the room, and there is no sign of an infection.  We will not need to run a culture, as there is just nothing showing up in terms of  nitrites or leukocyte esterase.  No blood seen.  He still has a trace of glucose, and he likely has a little irritation because of that.       ACTIVE MEDICINE PROBLEMS AND CURRENT MEDICATIONS:  Reviewed.      OBJECTIVE:  Dougie is in absolutely no distress.  He looks good.  He has lost 6 pounds in about 3 weeks.  This is all intentional.  We monitored his sugars with his smartphone.  BP is 145/83, and we will simply watch that for now given that he is losing weight so easily.  Pulse is 70 and regular.  He is afebrile.  He is 6 feet 0.64 inches in height and weighs 247 with a BMI of 32.91.  O2 sats are 98% on room air.  Physical exam not done.  Urine reviewed.      ASSESSMENT/PLAN:   1.  Recently diagnosed with controlled type 2 diabetes without complication, with long-term current use of insulin.  Responding very well.  Losing weight nicely.   2.  Return to clinic in about 3 months, at which point we will get a good and accurate reassessment of where his sugars are.  A1c, lipid panel and urine microalbumin will be obtained at that point.   3.  Visual changes due to changing sugar levels.  We really should wait until things have stabilized before having his eyes reexamined.  He will also have a new prescription.   4.  Call with any problems or questions.

## 2018-02-23 ENCOUNTER — TELEPHONE (OUTPATIENT)
Dept: PHARMACY | Facility: CLINIC | Age: 53
End: 2018-02-23

## 2018-02-23 NOTE — TELEPHONE ENCOUNTER
Dougie notes he is happy with his blood sugar   Lowest BG numbers was 86 (before bed, he ate yogurt when this occured) and 94 (prior to supper), 96 (prior to supper). Those are the only readings he has seen that are under 100. No symptoms of hypoglycemia.    In the morning, he notes that his readings are 110-130.   2 hours after supper, he sees his readings are 110-160.    He continues to focus on positive dietary habits.    He feels comfortable with his insulin doses.    Congratulated Dougie on his lifestyle changes and efforts to get his blood sugars at goal.  Will call Dougie in about 3 weeks (on March 20).  He will contact me sooner, if needed.

## 2018-03-06 DIAGNOSIS — E11.9 TYPE 2 DIABETES MELLITUS WITHOUT COMPLICATION, WITHOUT LONG-TERM CURRENT USE OF INSULIN (H): ICD-10-CM

## 2018-03-06 NOTE — TELEPHONE ENCOUNTER
Last office visit: 02/20/2018, no future appointment.  .Routing refill request to provider for review/approval because:  Provider to sign for mail order  Prescription approved per [facility] Refill Protocol.

## 2018-03-07 RX ORDER — METFORMIN HCL 500 MG
1000 TABLET, EXTENDED RELEASE 24 HR ORAL 2 TIMES DAILY WITH MEALS
Qty: 360 TABLET | Refills: 1 | Status: SHIPPED | OUTPATIENT
Start: 2018-03-07 | End: 2018-08-21

## 2018-03-09 DIAGNOSIS — E11.9 TYPE 2 DIABETES MELLITUS WITHOUT COMPLICATION, WITHOUT LONG-TERM CURRENT USE OF INSULIN (H): ICD-10-CM

## 2018-03-09 NOTE — TELEPHONE ENCOUNTER
Prescription approved per [facility] Refill Protocol.      Yesi Whittaker RN  March 9, 2018 3:11 PM

## 2018-03-10 RX ORDER — SYRINGE-NEEDLE,INSULIN,0.5 ML 27GX1/2"
SYRINGE, EMPTY DISPOSABLE MISCELLANEOUS
Qty: 200 EACH | Refills: 3 | Status: SHIPPED | OUTPATIENT
Start: 2018-03-10 | End: 2018-09-12

## 2018-03-20 ENCOUNTER — TELEPHONE (OUTPATIENT)
Dept: PHARMACY | Facility: CLINIC | Age: 53
End: 2018-03-20

## 2018-04-10 ENCOUNTER — TELEPHONE (OUTPATIENT)
Dept: PHARMACY | Facility: CLINIC | Age: 53
End: 2018-04-10

## 2018-04-10 NOTE — TELEPHONE ENCOUNTER
Called patient to follow up on blood glucose readings and insulin use.  Left message to return call.

## 2018-05-09 DIAGNOSIS — K57.32 DIVERTICULITIS OF COLON: Primary | ICD-10-CM

## 2018-05-09 RX ORDER — CIPROFLOXACIN 500 MG/1
500 TABLET, FILM COATED ORAL 2 TIMES DAILY
Qty: 14 TABLET | Refills: 0 | Status: SHIPPED | OUTPATIENT
Start: 2018-05-09 | End: 2018-09-12

## 2018-05-09 NOTE — TELEPHONE ENCOUNTER
Last visit 2/20/18    Routing refill request to provider for review/approval because:  Drug not on the [facility] refill protocol

## 2018-08-21 DIAGNOSIS — I10 ESSENTIAL HYPERTENSION, BENIGN: ICD-10-CM

## 2018-08-21 DIAGNOSIS — E11.9 TYPE 2 DIABETES MELLITUS WITHOUT COMPLICATION, WITHOUT LONG-TERM CURRENT USE OF INSULIN (H): ICD-10-CM

## 2018-08-21 RX ORDER — PROPRANOLOL HYDROCHLORIDE 160 MG/1
160 CAPSULE, EXTENDED RELEASE ORAL DAILY
Qty: 30 CAPSULE | Refills: 0 | Status: SHIPPED | OUTPATIENT
Start: 2018-08-21 | End: 2018-09-12

## 2018-08-21 RX ORDER — METFORMIN HCL 500 MG
1000 TABLET, EXTENDED RELEASE 24 HR ORAL 2 TIMES DAILY WITH MEALS
Qty: 120 TABLET | Refills: 0 | Status: SHIPPED | OUTPATIENT
Start: 2018-08-21 | End: 2018-09-12

## 2018-08-21 NOTE — TELEPHONE ENCOUNTER
Last office visit:  02/20/2018, no future appointment.    Medication is being filled for 1 time refill only due to:  Patient needs labs lipid panel, A1C. Future labs ordered yes. Patient needs to be seen because overdue for DM visit and BP check. .     Yesi Whittaker RN  August 21, 2018 8:20 PM

## 2018-09-07 DIAGNOSIS — E11.9 CONTROLLED TYPE 2 DIABETES MELLITUS WITHOUT COMPLICATION, WITH LONG-TERM CURRENT USE OF INSULIN (H): ICD-10-CM

## 2018-09-07 DIAGNOSIS — Z79.4 CONTROLLED TYPE 2 DIABETES MELLITUS WITHOUT COMPLICATION, WITH LONG-TERM CURRENT USE OF INSULIN (H): ICD-10-CM

## 2018-09-07 LAB
CHOLEST SERPL-MCNC: 189 MG/DL (ref 0–200)
CHOLEST/HDLC SERPL: 3.3 {RATIO} (ref 0–5)
FASTING SPECIMEN: YES
HBA1C MFR BLD: 6.3 % (ref 4.1–5.7)
HDLC SERPL-MCNC: 57 MG/DL
LDLC SERPL CALC-MCNC: 104 MG/DL (ref 0–129)
TRIGL SERPL-MCNC: 143 MG/DL (ref 0–150)
VLDL-CHOLESTEROL: 29 (ref 7–32)

## 2018-09-09 LAB
CREAT UR-MCNC: 193 MG/DL
MICROALBUMIN UR-MCNC: 25 MG/L
MICROALBUMIN/CREAT UR: 12.8 MG/G CR (ref 0–17)

## 2018-09-12 DIAGNOSIS — E11.9 TYPE 2 DIABETES MELLITUS WITHOUT COMPLICATION, WITHOUT LONG-TERM CURRENT USE OF INSULIN (H): ICD-10-CM

## 2018-09-12 DIAGNOSIS — G47.00 INSOMNIA, UNSPECIFIED TYPE: ICD-10-CM

## 2018-09-12 DIAGNOSIS — I10 ESSENTIAL HYPERTENSION, BENIGN: ICD-10-CM

## 2018-09-12 RX ORDER — METFORMIN HCL 500 MG
1000 TABLET, EXTENDED RELEASE 24 HR ORAL 2 TIMES DAILY WITH MEALS
Qty: 360 TABLET | Refills: 1 | Status: SHIPPED | OUTPATIENT
Start: 2018-09-12 | End: 2019-07-15

## 2018-09-12 RX ORDER — PROPRANOLOL HYDROCHLORIDE 160 MG/1
160 CAPSULE, EXTENDED RELEASE ORAL DAILY
Qty: 90 CAPSULE | Refills: 1 | Status: SHIPPED | OUTPATIENT
Start: 2018-09-12 | End: 2019-02-19

## 2018-09-12 RX ORDER — SYRINGE-NEEDLE,INSULIN,0.5 ML 27GX1/2"
SYRINGE, EMPTY DISPOSABLE MISCELLANEOUS
Qty: 200 EACH | Refills: 3 | Status: SHIPPED | OUTPATIENT
Start: 2018-09-12 | End: 2019-07-15

## 2018-09-12 RX ORDER — ZOLPIDEM TARTRATE 10 MG/1
10 TABLET ORAL
Qty: 90 TABLET | Refills: 1 | Status: SHIPPED | OUTPATIENT
Start: 2018-09-12 | End: 2019-07-15

## 2018-09-12 NOTE — TELEPHONE ENCOUNTER
Last visit 2/20/18  Routing refill request to provider for review/approval because:  To provider for mail order signing  Aishwarya Pollack RN  Care Coordinator  [facility]

## 2018-09-13 NOTE — TELEPHONE ENCOUNTER
Faxing zolpidem prescription to [facility] for refill  Aishwarya Pollack RN  Care Coordinator  [facility]

## 2018-10-24 ENCOUNTER — OFFICE VISIT (OUTPATIENT)
Dept: FAMILY MEDICINE | Facility: CLINIC | Age: 53
End: 2018-10-24
Payer: COMMERCIAL

## 2018-10-24 VITALS
BODY MASS INDEX: 35.48 KG/M2 | TEMPERATURE: 97.7 F | DIASTOLIC BLOOD PRESSURE: 94 MMHG | WEIGHT: 266.25 LBS | OXYGEN SATURATION: 98 % | SYSTOLIC BLOOD PRESSURE: 153 MMHG | HEART RATE: 81 BPM

## 2018-10-24 DIAGNOSIS — I10 ESSENTIAL HYPERTENSION: ICD-10-CM

## 2018-10-24 DIAGNOSIS — Z00.00 PREVENTATIVE HEALTH CARE: Primary | ICD-10-CM

## 2018-10-24 DIAGNOSIS — Z12.5 SCREENING FOR PROSTATE CANCER: ICD-10-CM

## 2018-10-24 DIAGNOSIS — E11.9 CONTROLLED TYPE 2 DIABETES MELLITUS WITHOUT COMPLICATION, WITH LONG-TERM CURRENT USE OF INSULIN (H): ICD-10-CM

## 2018-10-24 DIAGNOSIS — Z12.11 SCREEN FOR COLON CANCER: ICD-10-CM

## 2018-10-24 DIAGNOSIS — Z79.4 CONTROLLED TYPE 2 DIABETES MELLITUS WITHOUT COMPLICATION, WITH LONG-TERM CURRENT USE OF INSULIN (H): ICD-10-CM

## 2018-10-24 DIAGNOSIS — Z23 NEEDS FLU SHOT: ICD-10-CM

## 2018-10-24 LAB — PSA SERPL-ACNC: 0.88 UG/L (ref 0–4)

## 2018-10-24 RX ORDER — LISINOPRIL 5 MG/1
5 TABLET ORAL DAILY
Qty: 30 TABLET | Refills: 11 | Status: SHIPPED | OUTPATIENT
Start: 2018-10-24 | End: 2018-12-18

## 2018-10-24 ASSESSMENT — PAIN SCALES - GENERAL: PAINLEVEL: NO PAIN (0)

## 2018-10-24 NOTE — NURSING NOTE
"53 year old  Chief Complaint   Patient presents with     Physical       Blood pressure (!) 174/94, pulse 81, temperature 97.7  F (36.5  C), temperature source Oral, weight 266 lb 4 oz (120.8 kg), SpO2 98 %. Body mass index is 35.48 kg/(m^2).  Patient Active Problem List   Diagnosis     Insomnia     Essential hypertension     Preventative health care     Controlled type 2 diabetes mellitus without complication, with long-term current use of insulin (H)       Wt Readings from Last 2 Encounters:   10/24/18 266 lb 4 oz (120.8 kg)   02/20/18 247 lb (112 kg)     BP Readings from Last 3 Encounters:   10/24/18 (!) 174/94   02/20/18 145/83   01/23/18 136/88         Current Outpatient Prescriptions   Medication     Aspirin 81 MG TBDP     blood glucose monitoring (NO BRAND SPECIFIED) meter device kit     blood glucose monitoring (NO BRAND SPECIFIED) test strip     Ginkgo Biloba (GINKGO PO)     Ginseng (ELEUTHERO PO)     insulin NPH (HUMULIN N/NOVOLIN N) 100 UNIT/ML injection     insulin pen needle 31G X 6 MM     insulin syringe-needle U-100 29G X 1/2\" 1 ML     Lancets Misc. (MULTI-LANCET DEVICE 2) KIT     metFORMIN (GLUCOPHAGE-XR) 500 MG 24 hr tablet     propranolol (INDERAL LA) 160 MG 24 hr capsule     zolpidem (AMBIEN) 10 MG tablet     No current facility-administered medications for this visit.        Social History   Substance Use Topics     Smoking status: Never Smoker     Smokeless tobacco: Never Used     Alcohol use Yes      Comment: rarely       Health Maintenance Due   Topic Date Due     HIV SCREEN (SYSTEM ASSIGNED)  04/04/1984     EYE EXAM Q1 YEAR  11/05/2016     FOOT EXAM Q1 YEAR  06/10/2018     FIT Q1 YR  07/24/2018     INFLUENZA VACCINE (1) 09/01/2018       No results found for: DENISHA Jama MA  October 24, 2018 9:01 AM    Injectable Influenza Immunization Documentation    1.  Has the patient received the information for the injectable influenza vaccine? YES     2. Is the patient 6 months of age or " "older? YES     3. Does the patient have any of the following contraindications?         Severe allergy to eggs? No     Severe allergic reaction to previous influenza vaccines? No   Severe allergy to latex? No       History of Guillain-[place] syndrome? No     Currently have a temperature greater than 100.4F? No        4.  Severely egg allergic patients should have flu vaccine eligibility assessed by an MD, RN, or pharmacist, and those who received flu vaccine should be observed for 15 min by an MD, RN, Pharmacist, Medical Technician, or member of clinic staff.\": YES    5. Latex-allergic patients should be given latex-free influenza vaccine Yes. Please reference the Vaccine latex table to determine if your clinic s product is latex-containing.       Vaccination given by Allie Jama CMA          "

## 2018-10-24 NOTE — PROGRESS NOTES
CHIEF COMPLAINT: Annual Physical      HISTORY OF PRESENT ILLNESS: Dougie Nino is a 53 year old male who presents for an annual physical.     His BP is elevated today, will recheck. He reports he had a stressful morning, dealing with traffic and getting to the clinic. He has not taken his propranolol 160mg yet today, as he takes his medications at 10 am. However, he notes that his BP has been elevated recently in general.    He has type 2 diabetes, under good control with metformin 500 mg XR and insulin NPH, 12 units in the morning and 10 units in the evening. He is also taking aspirin 81mg daily. He met with Luci Posadas, Pharm D on 1/23/18 to discuss his medications, which he found somewhat helpful. His A1c was 6.3 when last checked on 9/7/18. He is monitoring his sugars at home. His weight is up slightly. He reports that he has not been exercising as much as he used to. He had a lipid panel on 9/7/18, which was normal. However, his LDL cholesterol was 104, and ideally we would like that under 100. Nonsmoker.       FAMILY, SOCIAL AND PAST SURGICAL HISTORIES:  Unchanged.       MEDICATIONS:  Carefully reviewed.       HEALTHCARE MAINTENANCE:    Health Maintenance   Topic Date Due     HIV SCREEN (SYSTEM ASSIGNED)  04/04/1984     EYE EXAM Q1 YEAR  11/05/2016     FOOT EXAM Q1 YEAR  06/10/2018     FIT Q1 YR  07/24/2018     INFLUENZA VACCINE (1) 09/01/2018     CREATININE Q1 YEAR  01/23/2019     PHQ-2 Q1 YR  02/20/2019     A1C Q6 MO  03/07/2019     LIPID MONITORING Q1 YEAR  09/07/2019     MICROALBUMIN Q1 YEAR  09/07/2019     TSH W/ FREE T4 REFLEX Q2 YEAR  01/23/2020     TETANUS IMMUNIZATION (SYSTEM ASSIGNED)  05/31/2021     PNEUMOVAX 1X HI RISK PATIENT < 65 (NO IB MSG)  Completed     Eye exam: Not up to date. Discussed getting this.  Hearing: No concerns.  Dental: Appointment tomorrow morning.  Immunizations: Due for flu vaccine.  Colonoscopy: Due for colon cancer screening. FIT card given.  PSA: Due for PSA.    REVIEW OF  SYSTEMS:  A 10-point review is negative.       OBJECTIVE:    GENERAL: Dougie Nino is in no distress.  Affect is upbeat.     VITAL SIGNS: BP (!) 174/94 (BP Location: Right arm, Patient Position: Chair, Cuff Size: Adult Large)  Pulse 81  Temp 97.7  F (36.5  C) (Oral)  Wt 266 lb 4 oz (120.8 kg)  SpO2 98%  BMI 35.48 kg/m2  HEENT:  Head is normocephalic, atraumatic.  LUNGS:  Clear to auscultation bilaterally.   HEART:  Regular rate and rhythm without murmur.   EXTREMITIES:  Ankles free of any edema. Diabetic foot exam and monofilament test normal.  SKIN:  Warm and dry.       LABORATORY DATA: Labs pending      ASSESSMENT AND PLAN:   1. Adult physical exam, up to date on healthcare maintenance strategies.   2. HCM: Flu vaccine administered. FIT card given.  3. Type 2 diabetes: Under good control. Ophthalmology referral placed.  4. Essential hypertension: Prescribed lisinopril 5mg. He will monitor home BP.  5. Labs: PSA pending    Call with any problems or questions.       I, Kesha Nino, am serving as a scribe to document services personally performed by Dr. Edgar Richards, based on data collection and the provider's statements to me.

## 2018-10-24 NOTE — MR AVS SNAPSHOT
After Visit Summary   10/24/2018    Dougie Nino    MRN: [id number]           Patient Information     Date Of Birth          1965        Visit Information        Provider Department      10/24/2018 8:40 AM Live Richards MD [facility]        Today's Diagnoses     Preventative health care    -  1    Controlled type 2 diabetes mellitus without complication, with long-term current use of insulin (H)        Needs flu shot        Screen for colon cancer        Essential hypertension        Screening for prostate cancer           Follow-ups after your visit        Additional Services     OPHTHALMOLOGY ADULT REFERRAL       Your provider has referred you to: [facility]: Eye Clinic [facility] [phone]   [email or web address]    Please be aware that coverage of these services is subject to the terms and limitations of your health insurance plan.  Call member services at your health plan with any benefit or coverage questions.      Please bring the following with you to your appointment:    (1) Any X-Rays, CTs or MRIs which have been performed.  Contact the facility where they were done to arrange for  prior to your scheduled appointment.    (2) List of current medications  (3) This referral request   (4) Any documents/labs given to you for this referral                  Follow-up notes from your care team     Return in about 6 months (around 4/24/2019) for Follow-up.      Who to contact     Please call your clinic at [phone] to:    Ask questions about your health    Make or cancel appointments    Discuss your medicines    Learn about your test results    Speak to your doctor            Additional Information About Your Visit        MyChart Information     [employer] gives you secure access to your electronic health record. If you see a primary care provider, you can also send messages to your care team and make appointments. If you have questions, please  call your primary care clinic.  If you do not have a primary care provider, please call [phone] and they will assist you.      [employer] is an electronic gateway that provides easy, online access to your medical records. With [employer], you can request a clinic appointment, read your test results, renew a prescription or communicate with your care team.     To access your existing account, please contact your [facility] or call [phone] for assistance.        Care EveryWhere ID     This is your Care EveryWhere ID. This could be used by other organizations to access your [place] medical records  [id number]        Your Vitals Were     Pulse Temperature Pulse Oximetry BMI (Body Mass Index)          81 97.7  F (36.5  C) (Oral) 98% 35.48 kg/m2         Blood Pressure from Last 3 Encounters:   10/24/18 (!) 153/94   02/20/18 145/83   01/23/18 136/88    Weight from Last 3 Encounters:   10/24/18 266 lb 4 oz (120.8 kg)   02/20/18 247 lb (112 kg)   01/23/18 253 lb (114.8 kg)              We Performed the Following     ADMIN INFLUENZA VIRUS VACCINE     C FOOT EXAM  NO CHARGE     C RIV4 (FLUBLOK) VACCINE RECOMBINANT DNA PRSRV ANTIBIO FREE, IM     OPHTHALMOLOGY ADULT REFERRAL     Prostate spec antigen screen          Today's Medication Changes          These changes are accurate as of 10/24/18 11:59 PM.  If you have any questions, ask your nurse or doctor.               Start taking these medicines.        Dose/Directions    lisinopril 5 MG tablet   Commonly known as:  PRINIVIL/ZESTRIL   Used for:  Essential hypertension   Started by:  Live Richards MD        Dose:  5 mg   Take 1 tablet (5 mg) by mouth daily   Quantity:  30 tablet   Refills:  11            Where to get your medicines      These medications were sent to [place] [address] [facility] [place] [address]  [address] [address] [facility] [address]     Phone:  [phone]     lisinopril 5 MG tablet                 Primary Care Provider Office Phone # Fax #    Live Richards [phone] [address] [place] [id number]        Equal Access to Services     NAVNEET ARAUJO : Hadii aad ku hadrashmi Sodion, waaxda luqadaha, qaybta kaalmada abebe, aylin rivera yamiletayleen sanchez mohsen lazaro. So [facility] [phone].    ATENCIÓN: Si habla español, tiene a dutta disposición servicios gratuitos de asistencia lingüística. Llame al [phone].    We comply with applicable federal civil rights laws and [place] laws. We do not discriminate on the basis of race, color, national origin, age, disability, sex, sexual orientation, or gender identity.            Thank you!     Thank you for choosing [facility]  for your care. Our goal is always to provide you with excellent care. Hearing back from our patients is one way we can continue to improve our services. Please take a few minutes to complete the written survey that you may receive in the mail after your visit with us. Thank you!             Your Updated Medication List - Protect others around you: Learn how to safely use, store and throw away your medicines at www.disposemymeds.org.          This list is accurate as of 10/24/18 11:59 PM.  Always use your most recent med list.                   Brand Name Dispense Instructions for use Diagnosis    Aspirin 81 MG Tbdp      Take 1 tablet by mouth daily        blood glucose monitoring meter device kit    no brand specified    1 kit    Use to test blood sugar 2 times daily or as directed.    Type 2 diabetes mellitus with hyperglycemia, with long-term current use of insulin (H)       blood glucose monitoring test strip    no brand specified    100 strip    Use to test blood sugars 2 times daily or as directed    Type 2 diabetes mellitus with hyperglycemia, with long-term current use of insulin (H)       ELEUTHERO PO      Take 1 tablet by mouth daily        GINKGO PO      Take 1 tablet by mouth daily      "   insulin  UNIT/ML injection    HumuLIN N/NovoLIN N    10 mL    Inject 12 Units Subcutaneous in the morning and 10 units subcutaneously in the evening    Type 2 diabetes mellitus without complication, without long-term current use of insulin (H)       insulin pen needle 31G X 6 MM     100 each    Use once daily or as directed.    Type 2 diabetes mellitus without complication, without long-term current use of insulin (H)       insulin syringe-needle U-100 29G X 1/2\" 1 ML     200 each    Use 2 syringes daily or as directed.    Type 2 diabetes mellitus without complication, without long-term current use of insulin (H)       lisinopril 5 MG tablet    PRINIVIL/ZESTRIL    30 tablet    Take 1 tablet (5 mg) by mouth daily    Essential hypertension       metFORMIN 500 MG 24 hr tablet    GLUCOPHAGE-XR    360 tablet    Take 2 tablets (1,000 mg) by mouth 2 times daily (with meals)    Type 2 diabetes mellitus without complication, without long-term current use of insulin (H)       MULTI-LANCET DEVICE 2 Kit     1 kit    1 lancet 2 times daily As needed to check blood sugar levels    Type 2 diabetes mellitus with hyperglycemia, with long-term current use of insulin (H)       propranolol 160 MG 24 hr capsule    INDERAL LA    90 capsule    Take 1 capsule (160 mg) by mouth daily    Essential hypertension, benign       zolpidem 10 MG tablet    AMBIEN    90 tablet    Take 1 tablet (10 mg) by mouth nightly as needed for sleep    Insomnia, unspecified type         "

## 2018-12-18 ENCOUNTER — MYC REFILL (OUTPATIENT)
Dept: FAMILY MEDICINE | Facility: CLINIC | Age: 53
End: 2018-12-18

## 2018-12-18 DIAGNOSIS — I10 ESSENTIAL HYPERTENSION: ICD-10-CM

## 2018-12-18 RX ORDER — LISINOPRIL 5 MG/1
5 TABLET ORAL DAILY
Qty: 90 TABLET | Refills: 1 | Status: SHIPPED | OUTPATIENT
Start: 2018-12-18 | End: 2019-06-13

## 2018-12-18 NOTE — TELEPHONE ENCOUNTER
Last seen 10/24/18 for annual., best BP at that visit was 153/94 and you started him on lisinopril 5 mg at that visit.     Pt fails protocol  with elevated BP in EMR.  Note , in this Mychart request he reports  BPs. are 135ish/85 jeanie at home    Are you willing to refill with these home readings?    To MD to lluu, and also mail order    Yesi Whittaker RN  December 18, 2018 5:55 PM

## 2019-02-19 DIAGNOSIS — I10 ESSENTIAL HYPERTENSION, BENIGN: ICD-10-CM

## 2019-02-19 RX ORDER — PROPRANOLOL HYDROCHLORIDE 160 MG/1
160 CAPSULE, EXTENDED RELEASE ORAL DAILY
Qty: 90 CAPSULE | Refills: 1 | Status: SHIPPED | OUTPATIENT
Start: 2019-02-19 | End: 2019-06-10

## 2019-02-19 NOTE — TELEPHONE ENCOUNTER
Last seen 10/24/18    Routing refill request to provider for review/approval because:  Pt fails BP for this refill  With visit  BP reading in EMR , yet [facility] message of 12/18/18 reports home BP's of 135 jeanie/85 jeanie.  You refilled lisinopril    ( new med) back in Dec with those home BP's      Are you willing to refill this med ?    Yesi Whittaker RN  February 19, 2019 4:59 PM

## 2019-03-21 ENCOUNTER — MYC REFILL (OUTPATIENT)
Dept: FAMILY MEDICINE | Facility: CLINIC | Age: 54
End: 2019-03-21

## 2019-03-21 DIAGNOSIS — E11.9 TYPE 2 DIABETES MELLITUS WITHOUT COMPLICATION, WITHOUT LONG-TERM CURRENT USE OF INSULIN (H): ICD-10-CM

## 2019-03-21 RX ORDER — SYRINGE-NEEDLE,INSULIN,0.5 ML 27GX1/2"
SYRINGE, EMPTY DISPOSABLE MISCELLANEOUS
Qty: 200 EACH | Refills: 3 | Status: CANCELLED | OUTPATIENT
Start: 2019-03-21

## 2019-03-22 NOTE — TELEPHONE ENCOUNTER
Patient requests a different size/guage insulin needle: 31 guage, 8mm     Mrayuri Pryor, GRAYSON  03/22/19  8:44 AM

## 2019-06-10 ENCOUNTER — MYC REFILL (OUTPATIENT)
Dept: FAMILY MEDICINE | Facility: CLINIC | Age: 54
End: 2019-06-10

## 2019-06-10 DIAGNOSIS — I10 ESSENTIAL HYPERTENSION, BENIGN: ICD-10-CM

## 2019-06-11 RX ORDER — PROPRANOLOL HYDROCHLORIDE 160 MG/1
160 CAPSULE, EXTENDED RELEASE ORAL DAILY
Qty: 90 CAPSULE | Refills: 0 | Status: SHIPPED | OUTPATIENT
Start: 2019-06-11 | End: 2019-09-25

## 2019-06-11 NOTE — TELEPHONE ENCOUNTER
Last Office Visit: 10/24/18   [facility] [place] none  Medication last refilled: 2/19/19 #90 + 1 refill    BP Readings from Last 3 Encounters:   10/24/18 (!) 153/94   02/20/18 145/83   01/23/18 136/88     Prescription [place] per [facility] [place].  Maryuri Pryor RN  06/11/19  10:54 AM

## 2019-06-13 ENCOUNTER — MYC REFILL (OUTPATIENT)
Dept: FAMILY MEDICINE | Facility: CLINIC | Age: 54
End: 2019-06-13

## 2019-06-13 DIAGNOSIS — I10 ESSENTIAL HYPERTENSION: ICD-10-CM

## 2019-06-13 RX ORDER — LISINOPRIL 5 MG/1
5 TABLET ORAL DAILY
Qty: 90 TABLET | Refills: 1 | Status: SHIPPED | OUTPATIENT
Start: 2019-06-13 | End: 2019-10-23 | Stop reason: DRUGHIGH

## 2019-06-13 NOTE — TELEPHONE ENCOUNTER
Last visit 10/24/18, no future visit.  Medication is being filled for 1 time refill only due to:  Patient needs labs BMP. Future labs ordered BMP. Patient needs to be seen because needs BP check.     To provider for mail order signing.  Aishwarya Pollack RN  [facility]

## 2019-07-15 ENCOUNTER — MYC REFILL (OUTPATIENT)
Dept: FAMILY MEDICINE | Facility: CLINIC | Age: 54
End: 2019-07-15

## 2019-07-15 ENCOUNTER — MYC REFILL (OUTPATIENT)
Dept: PHARMACY | Facility: CLINIC | Age: 54
End: 2019-07-15

## 2019-07-15 DIAGNOSIS — E11.9 TYPE 2 DIABETES MELLITUS WITHOUT COMPLICATION, WITHOUT LONG-TERM CURRENT USE OF INSULIN (H): ICD-10-CM

## 2019-07-15 DIAGNOSIS — G47.00 INSOMNIA, UNSPECIFIED TYPE: ICD-10-CM

## 2019-07-16 ENCOUNTER — MYC MEDICAL ADVICE (OUTPATIENT)
Dept: FAMILY MEDICINE | Facility: CLINIC | Age: 54
End: 2019-07-16

## 2019-07-16 DIAGNOSIS — K57.32 DIVERTICULITIS OF COLON: ICD-10-CM

## 2019-07-16 RX ORDER — ZOLPIDEM TARTRATE 10 MG/1
10 TABLET ORAL
Qty: 90 TABLET | Refills: 0
Start: 2019-07-16 | End: 2019-10-23

## 2019-07-16 RX ORDER — METFORMIN HCL 500 MG
1000 TABLET, EXTENDED RELEASE 24 HR ORAL 2 TIMES DAILY WITH MEALS
Qty: 360 TABLET | Refills: 1 | Status: SHIPPED | OUTPATIENT
Start: 2019-07-16 | End: 2019-10-25

## 2019-07-16 RX ORDER — SYRINGE-NEEDLE,INSULIN,0.5 ML 27GX1/2"
SYRINGE, EMPTY DISPOSABLE MISCELLANEOUS
Qty: 200 EACH | Refills: 3 | Status: SHIPPED | OUTPATIENT
Start: 2019-07-16 | End: 2019-07-19 | Stop reason: ALTCHOICE

## 2019-07-16 NOTE — TELEPHONE ENCOUNTER
Last Office Visit: 10/24/18  [facility] [place]  Medication last refilled: 9/12/18 #90 + 1 refill    Routing refill request to provider for review/approval because: Drug not on the [facility] refill protocol   Maryuri Pryor RN

## 2019-07-16 NOTE — TELEPHONE ENCOUNTER
Last visit 10/24/18, no future visit    Medication is being filled for 1 time refill only due to:  Patient needs labs A1c, BMP - BMP already in chart. Future labs ordered Aic. Patient needs to be seen because needs BP check.

## 2019-07-16 NOTE — TELEPHONE ENCOUNTER
Last Office Visit: 12/24/18  [facility] [place]ointments: none  Medication last refilled: 9/12/18    Prescription approved per [facility] Refill Protocol.    Maryuri Pryor RN  07/16/19  2:37 PM

## 2019-07-17 ENCOUNTER — MYC MEDICAL ADVICE (OUTPATIENT)
Dept: FAMILY MEDICINE | Facility: CLINIC | Age: 54
End: 2019-07-17

## 2019-07-17 ENCOUNTER — TELEPHONE (OUTPATIENT)
Dept: FAMILY MEDICINE | Facility: CLINIC | Age: 54
End: 2019-07-17

## 2019-07-17 RX ORDER — CIPROFLOXACIN 500 MG/1
500 TABLET, FILM COATED ORAL 2 TIMES DAILY
Qty: 14 TABLET | Refills: 0 | Status: SHIPPED | OUTPATIENT
Start: 2019-07-17 | End: 2019-10-23

## 2019-07-17 NOTE — TELEPHONE ENCOUNTER
Last Office Visit: 10/24/18  Future [facility] Appointments: none  Medication last ordered: 5/9/18    Patient has Cipro on hand for diverticulitis flare ups.     Routing refill request to provider for review/approval because: Drug not on the [facility] refill protocol     Maryuri Pryor RN

## 2019-07-17 NOTE — TELEPHONE ENCOUNTER
[facility] Call Center    Phone Message    May a detailed message be left on voicemail: yes    Reason for Call: Medication Question or concern regarding medication   Prescription Clarification  Name of Medication: Zolpidem   Prescribing Provider: Live Richards   Pharmacy:  [facility]   What on the order needs clarification? Teresa called from the pharmacy wanting to verify the patients demographics, I had them verified but she insisted on speaking to  directly. Please follow up with Teresa at the [facility].          Action Taken: Message routed to:  [facility]:

## 2019-07-17 NOTE — TELEPHONE ENCOUNTER
Called Teresa, pharmacist at [facility]. She needed demographic verification to assure correct patient, [place]. All questions answered.   Maryuri Pryor RN  07/17/19  2:56 PM

## 2019-07-17 NOTE — TELEPHONE ENCOUNTER
Zolpidem has been called into pharmacy, Cipro is pending approval and will be e-scribed upon signing.   Maryuri Pryor RN  07/17/19  11:38 AM

## 2019-07-18 ENCOUNTER — TELEPHONE (OUTPATIENT)
Dept: FAMILY MEDICINE | Facility: CLINIC | Age: 54
End: 2019-07-18

## 2019-07-18 DIAGNOSIS — Z79.4 CONTROLLED TYPE 2 DIABETES MELLITUS WITHOUT COMPLICATION, WITH LONG-TERM CURRENT USE OF INSULIN (H): Primary | ICD-10-CM

## 2019-07-18 DIAGNOSIS — E11.9 TYPE 2 DIABETES MELLITUS WITHOUT COMPLICATION, WITHOUT LONG-TERM CURRENT USE OF INSULIN (H): ICD-10-CM

## 2019-07-18 DIAGNOSIS — E11.9 CONTROLLED TYPE 2 DIABETES MELLITUS WITHOUT COMPLICATION, WITH LONG-TERM CURRENT USE OF INSULIN (H): Primary | ICD-10-CM

## 2019-07-18 NOTE — TELEPHONE ENCOUNTER
[facility]    Phone Message    May a detailed message be left on voicemail: yes    Reason for Call: Medication Refill Request    Has the patient contacted the pharmacy for the refill? Yes   Name of medication being requested: syringes (31G X 8 MM) 31G X 8 MM miscellaneous  Provider who prescribed the medication: Live Richards  Pharmacy:      [facility] [address] [place] [id number] [facility]      Date medication is needed: Pharmacy called back and they stated the patient needs syringes in the same size the needles were sent over. Please send script for syringes.         Action Taken: Message routed to:  [facility]:

## 2019-07-19 RX ORDER — BLOOD SUGAR DIAGNOSTIC
STRIP MISCELLANEOUS
Qty: 200 EACH | Refills: 3 | Status: SHIPPED | OUTPATIENT
Start: 2019-07-19 | End: 2019-10-23

## 2019-07-19 NOTE — TELEPHONE ENCOUNTER
Spoke with pharmacist  She clarified exactly what pt wants /needs, and that is a 31 gauge, 8 mm needle ( 5/16th inch)  attached to a 0.3 ml syringe.  This has now been sent.  Removed other options from his med list    Yesi Whittaker RN  July 19, 2019 2:14 PM

## 2019-07-22 ENCOUNTER — MYC REFILL (OUTPATIENT)
Dept: FAMILY MEDICINE | Facility: CLINIC | Age: 54
End: 2019-07-22

## 2019-07-22 DIAGNOSIS — Z79.4 CONTROLLED TYPE 2 DIABETES MELLITUS WITHOUT COMPLICATION, WITH LONG-TERM CURRENT USE OF INSULIN (H): ICD-10-CM

## 2019-07-22 DIAGNOSIS — E11.9 CONTROLLED TYPE 2 DIABETES MELLITUS WITHOUT COMPLICATION, WITH LONG-TERM CURRENT USE OF INSULIN (H): ICD-10-CM

## 2019-07-22 RX ORDER — BLOOD SUGAR DIAGNOSTIC
STRIP MISCELLANEOUS
Qty: 200 EACH | Refills: 3 | Status: CANCELLED | OUTPATIENT
Start: 2019-07-22

## 2019-07-22 NOTE — TELEPHONE ENCOUNTER
Already sent this script in on Friday, and confirmed with [facility].they received     Yesi Whittaker RN  July 22, 2019 2:35 PM

## 2019-09-09 DIAGNOSIS — Z53.9 ERRONEOUS ENCOUNTER--DISREGARD: Primary | ICD-10-CM

## 2019-09-09 DIAGNOSIS — E11.9 TYPE 2 DIABETES MELLITUS WITHOUT COMPLICATION, WITHOUT LONG-TERM CURRENT USE OF INSULIN (H): ICD-10-CM

## 2019-09-09 NOTE — TELEPHONE ENCOUNTER
Last time prescribed: 7/16/19 , 10mL  x 0 refills  Last office visit: 10/24/18  Next appointment: 10/23/19    Medication is being filled for 1 time refill only due to:  Patient needs labs BMP, A1c, lipid plus. Future labs ordered lipid plus - other needed labs in chart. Patient needs to be seen because diabetes visit.   Aishwarya Pollack RN  [facility]

## 2019-09-24 DIAGNOSIS — I10 ESSENTIAL HYPERTENSION, BENIGN: ICD-10-CM

## 2019-09-24 NOTE — TELEPHONE ENCOUNTER
[facility] Call Center    Phone Message    May a detailed message be left on voicemail: yes    Reason for Call: Medication Refill Request    Has the patient contacted the pharmacy for the refill? Yes   Name of medication being requested: Propanolol   Provider who prescribed the medication: [facility]  Pharmacy: [facility]  Date medication is needed: ASAP, pt will be out tomorrow. Even with overnight, he will not get meds in time, so would like emergency supply sent to [facility] in [place].          Action Taken: Message routed to:  [facility]: [facility]

## 2019-09-25 RX ORDER — PROPRANOLOL HYDROCHLORIDE 160 MG/1
160 CAPSULE, EXTENDED RELEASE ORAL DAILY
Qty: 90 CAPSULE | Refills: 0 | Status: SHIPPED | OUTPATIENT
Start: 2019-09-25 | End: 2019-10-07

## 2019-09-25 NOTE — TELEPHONE ENCOUNTER
Last Office Visit: 10/24/18  Future [facility] Appointments: 10/23/19  Medication last refilled: 6/11/19 #90      BP Readings from Last 3 Encounters:   10/24/18 (!) 153/94   02/20/18 145/83   01/23/18 136/88     Patient should be seen every 6 months.    Maryuri Pryor RN  09/25/19  10:17 AM

## 2019-09-30 ENCOUNTER — HEALTH MAINTENANCE LETTER (OUTPATIENT)
Age: 54
End: 2019-09-30

## 2019-10-07 DIAGNOSIS — I10 ESSENTIAL HYPERTENSION, BENIGN: ICD-10-CM

## 2019-10-07 RX ORDER — PROPRANOLOL HYDROCHLORIDE 160 MG/1
160 CAPSULE, EXTENDED RELEASE ORAL DAILY
Qty: 90 CAPSULE | Refills: 0 | Status: SHIPPED | OUTPATIENT
Start: 2019-10-07 | End: 2019-10-25

## 2019-10-07 NOTE — TELEPHONE ENCOUNTER
Pt had script most recently to [facility] for propranol. Pt requesting 90 day supply of propranolol 160 mg to [facility] - will be seeing Dr Rcihards 10/23.  Script forwarded to [facility].  Aishwarya Pollack RN  [facility]

## 2019-10-23 ENCOUNTER — OFFICE VISIT (OUTPATIENT)
Dept: FAMILY MEDICINE | Facility: CLINIC | Age: 54
End: 2019-10-23
Payer: COMMERCIAL

## 2019-10-23 VITALS
HEART RATE: 78 BPM | WEIGHT: 274.04 LBS | TEMPERATURE: 97.8 F | BODY MASS INDEX: 36.32 KG/M2 | OXYGEN SATURATION: 96 % | DIASTOLIC BLOOD PRESSURE: 96 MMHG | SYSTOLIC BLOOD PRESSURE: 159 MMHG | HEIGHT: 73 IN

## 2019-10-23 DIAGNOSIS — Z12.12 SCREENING FOR COLORECTAL CANCER: ICD-10-CM

## 2019-10-23 DIAGNOSIS — Z23 NEEDS FLU SHOT: ICD-10-CM

## 2019-10-23 DIAGNOSIS — G47.00 INSOMNIA, UNSPECIFIED TYPE: ICD-10-CM

## 2019-10-23 DIAGNOSIS — I10 ESSENTIAL HYPERTENSION: ICD-10-CM

## 2019-10-23 DIAGNOSIS — Z23 NEED FOR IMMUNIZATION AGAINST TYPHOID: ICD-10-CM

## 2019-10-23 DIAGNOSIS — E11.9 TYPE 2 DIABETES MELLITUS WITHOUT COMPLICATION, WITHOUT LONG-TERM CURRENT USE OF INSULIN (H): ICD-10-CM

## 2019-10-23 DIAGNOSIS — Z00.00 PREVENTATIVE HEALTH CARE: Primary | ICD-10-CM

## 2019-10-23 DIAGNOSIS — Z12.5 SCREENING FOR PROSTATE CANCER: ICD-10-CM

## 2019-10-23 DIAGNOSIS — Z13.220 SCREENING FOR LIPOID DISORDERS: ICD-10-CM

## 2019-10-23 DIAGNOSIS — Z12.11 SCREENING FOR COLORECTAL CANCER: ICD-10-CM

## 2019-10-23 DIAGNOSIS — R00.2 PALPITATIONS: ICD-10-CM

## 2019-10-23 DIAGNOSIS — Z71.84 COUNSELING FOR TRAVEL: ICD-10-CM

## 2019-10-23 LAB
BUN SERPL-MCNC: 18 MG/DL (ref 7–30)
CALCIUM SERPL-MCNC: 9.7 MG/DL (ref 8.5–10.4)
CHLORIDE SERPLBLD-SCNC: 104 MMOL/L (ref 94–109)
CHOLEST SERPL-MCNC: 188 MG/DL (ref 0–200)
CHOLEST/HDLC SERPL: 3.6 {RATIO} (ref 0–5)
CO2 SERPL-SCNC: 29 MMOL/L (ref 20–32)
CREAT SERPL-MCNC: 0.7 MG/DL (ref 0.8–1.5)
CREAT UR-MCNC: 171 MG/DL
EGFR CALCULATED (BLACK REFERENCE): 151.7
EGFR CALCULATED (NON BLACK REFERENCE): 125.4
FASTING SPECIMEN: NO
GLUCOSE SERPL-MCNC: 158 MG/DL (ref 60–99)
HBA1C MFR BLD: 6.9 % (ref 4.1–5.7)
HDLC SERPL-MCNC: 53 MG/DL
LDLC SERPL CALC-MCNC: 87 MG/DL (ref 0–129)
MICROALBUMIN UR-MCNC: 32 MG/L
MICROALBUMIN/CREAT UR: 18.66 MG/G CR (ref 0–17)
POTASSIUM SERPL-SCNC: 3.7 MMOL/L (ref 3.4–5.3)
SODIUM SERPL-SCNC: 143 MMOL/L (ref 137.3–146.3)
TRIGL SERPL-MCNC: 240 MG/DL (ref 0–150)
VLDL-CHOLESTEROL: 48 (ref 7–32)

## 2019-10-23 RX ORDER — ZOLPIDEM TARTRATE 10 MG/1
10 TABLET ORAL
Qty: 90 TABLET | Refills: 0 | Status: CANCELLED | OUTPATIENT
Start: 2019-10-23

## 2019-10-23 RX ORDER — ZOLPIDEM TARTRATE 10 MG/1
10 TABLET ORAL
Qty: 90 TABLET | Refills: 1 | Status: SHIPPED | OUTPATIENT
Start: 2019-10-23 | End: 2022-03-25

## 2019-10-23 RX ORDER — LISINOPRIL 10 MG/1
10 TABLET ORAL DAILY
Qty: 90 TABLET | Refills: 4 | Status: SHIPPED | OUTPATIENT
Start: 2019-10-23 | End: 2021-01-26

## 2019-10-23 RX ORDER — METFORMIN HCL 500 MG
1000 TABLET, EXTENDED RELEASE 24 HR ORAL 2 TIMES DAILY WITH MEALS
Qty: 360 TABLET | Refills: 1 | Status: CANCELLED | OUTPATIENT
Start: 2019-10-23

## 2019-10-23 RX ORDER — PROPRANOLOL HYDROCHLORIDE 160 MG/1
160 CAPSULE, EXTENDED RELEASE ORAL DAILY
Qty: 90 CAPSULE | Refills: 0 | Status: CANCELLED | OUTPATIENT
Start: 2019-10-23

## 2019-10-23 RX ORDER — LISINOPRIL 5 MG/1
5 TABLET ORAL DAILY
Qty: 90 TABLET | Refills: 1 | Status: CANCELLED | OUTPATIENT
Start: 2019-10-23

## 2019-10-23 ASSESSMENT — PAIN SCALES - GENERAL: PAINLEVEL: MILD PAIN (3)

## 2019-10-23 ASSESSMENT — MIFFLIN-ST. JEOR: SCORE: 2144.3

## 2019-10-23 NOTE — PROGRESS NOTES
CHIEF COMPLAINT: Annual Physical      HISTORY OF PRESENT ILLNESS: Dougie Nino is a 54 year old male who presents for an annual physical. Overall, he is doing well. He has been traveling less for work which has reduced his stress.     He has type 2 diabetes that is well controlled with metformin 1000mg BID and insulin NPH, 12 units in the morning and 10 units in the evening. His last A1c was 6.3% on 9/7/18. His LDL was 104 on 9/7/18. He is not on a statin currently. He takes a daily aspirin and is a nonsmoker.     He has a history of hypertension and is taking lisinopril 5 mg daily and propranolol  mg daily. His blood pressure is elevated today. He has been experiencing heart fluttering daily. He believed this was due to the stress he was having at work when he was traveling more frequently. He drinks 2 cups of coffee a day.        FAMILY, SOCIAL AND PAST SURGICAL HISTORIES:  Unchanged.       MEDICATIONS:  Carefully reviewed.       HEALTHCARE MAINTENANCE:    Health Maintenance   Topic Date Due     HIV SCREENING  04/04/1981     ZOSTER IMMUNIZATION (1 of 2) 04/04/2016     EYE EXAM  11/05/2016     PNEUMOCOCCAL IMMUNIZATION 19-64 MEDIUM RISK (1 of 1 - PPSV23) 08/04/2017     FIT  07/24/2018     PHQ-2  01/01/2019     BMP  01/23/2019     A1C  03/07/2019     INFLUENZA VACCINE (1) 09/01/2019     LIPID  09/07/2019     MICROALBUMIN  09/07/2019     PREVENTIVE CARE VISIT  10/24/2019     DIABETIC FOOT EXAM  10/24/2019     TSH W/FREE T4 REFLEX  01/23/2020     DTAP/TDAP/TD IMMUNIZATION (3 - Td) 05/31/2021     IPV IMMUNIZATION  Aged Out     MENINGITIS IMMUNIZATION  Aged Out     Eye exam: He wears reading glasses and is due for an eye exam.   Hearing: No concerns  Dental: Up to date  BP: 159/96  BMI: 36.01 kg/m , gained 8 pounds since 10/24/18  Immunizations: Due for flu vaccine. Tdap up to date until 2021.   Colonoscopy: FIT card    REVIEW OF SYSTEMS:  A 10-point review is negative.       OBJECTIVE:    GENERAL: Dougie Nino  "is in no distress.  Affect is upbeat.     VITAL SIGNS: BP (!) 159/96 (BP Location: Left arm, Patient Position: Chair, Cuff Size: Adult Large)   Pulse 78   Temp 97.8  F (36.6  C) (Oral)   Ht 1.858 m (6' 1.15\")   Wt 124.3 kg (274 lb 0.6 oz)   SpO2 96%   BMI 36.01 kg/m    HEENT:  Head is normocephalic, atraumatic. Ears clear bilaterally. No pain with palpation of the frontal or maxillary sinuses.  Eyes are grossly normal.  Nose is free of any congestion or discharge.  Teeth in good repair.  Mucous membranes are moist.  Throat looks benign.  Neck is supple without adenopathy or thyromegaly.  No carotid bruits are heard, no JVD.   BACK:  Smooth and straight.  No pain to percussion.  No CVA tenderness.   LUNGS:  Clear to auscultation bilaterally.   HEART:  Regular rate and rhythm without murmur.   ABDOMEN:  Benign.     EXTREMITIES:  Ankles free of any edema.   SKIN:  Warm and dry.       LABORATORY DATA: Labs pending    EKG was obtained and shows a sinus rhythm with a rate of 71 beats per minute and regular. Normal axis, normal intervals, no acute ST/T changes c/w ischemia, no LVH by voltage criteria. Absolutely no ectopy seen with EKG or rhythm strip.     ASSESSMENT AND PLAN:   1. Adult physical exam, up to date on healthcare maintenance strategies.   2. Seasonal flu and typhoid vaccines given today.  3. Colon cancer screening with FIT card   4. Type 2 diabetes, well controlled. Hemoglobin A1c, BMP, lipid panel and microalbumin, pending  5. Hypertension, not well controlled. Increase lisinopril dose to 10mg daily. Continue taking propranolol 160 mg daily.   6. Palpitations, occurring daily. EKG was normal0 today. No further work-up at this time and they were likely due to high levels of stress.  That's better now--and they are occurring less frequently.  If they return, we can obtain a Zio Patch monitor.  7. Seborrheic keratosis, benign. Recommend purchasing OTC liquid N2 kit to be used to freeze lesions at " home.  8. Follow-up in 1 year for annual wellness visit.    Call with any problems or questions.     I, Costa Friedman, am serving as a scribe to document services personally performed by Dr. Live Richards, based on data collection and the provider's statements to me. Dr. Richards has reviewed, edited, and approved the above note.     --Live Richards MD

## 2019-10-23 NOTE — NURSING NOTE
"54 year old  Chief Complaint   Patient presents with     Physical     Refill Request     request the smallest Insulin needles. Humulin and Zolpidem go to [facility].  And Lisinopril, Metformn and Propranolol go to [facility].      Flu Shot       Blood pressure (!) 159/96, pulse 78, temperature 97.8  F (36.6  C), temperature source Oral, height 1.858 m (6' 1.15\"), weight 124.3 kg (274 lb 0.6 oz), SpO2 96 %. Body mass index is 36.01 kg/m .  Patient Active Problem List   Diagnosis     Insomnia     Essential hypertension     Preventative health care     Controlled type 2 diabetes mellitus without complication, with long-term current use of insulin (H)       Wt Readings from Last 2 Encounters:   10/23/19 124.3 kg (274 lb 0.6 oz)   10/24/18 120.8 kg (266 lb 4 oz)     BP Readings from Last 3 Encounters:   10/23/19 (!) 159/96   10/24/18 (!) 153/94   02/20/18 145/83         Current Outpatient Medications   Medication     Aspirin 81 MG TBDP     blood glucose monitoring (NO BRAND SPECIFIED) meter device kit     blood glucose monitoring (NO BRAND SPECIFIED) test strip     ciprofloxacin (CIPRO) 500 MG tablet     Ginkgo Biloba (GINKGO PO)     Ginseng (ELEUTHERO PO)     insulin NPH (HUMULIN N/NOVOLIN N VIAL) 100 UNIT/ML vial     Lancets Misc. (MULTI-LANCET DEVICE 2) KIT     lisinopril (PRINIVIL/ZESTRIL) 5 MG tablet     metFORMIN (GLUCOPHAGE-XR) 500 MG 24 hr tablet     propranolol ER (INDERAL LA) 160 MG 24 hr capsule     zolpidem (AMBIEN) 10 MG tablet     insulin pen needle (31G X 8 MM) 31G X 8 MM miscellaneous     insulin pen needle 31G X 6 MM     insulin syringe-needle U-100 (31G X 5/16\" 0.3 ML) 31G X 5/16\" 0.3 ML miscellaneous     No current facility-administered medications for this visit.        Social History     Tobacco Use     Smoking status: Never Smoker     Smokeless tobacco: Never Used   Substance Use Topics     Alcohol use: Yes     Comment: rarely     Drug use: No       Health Maintenance Due   Topic Date Due     HIV " SCREENING  04/04/1981     ZOSTER IMMUNIZATION (1 of 2) 04/04/2016     EYE EXAM  11/05/2016     PNEUMOCOCCAL IMMUNIZATION 19-64 MEDIUM RISK (1 of 1 - PPSV23) 08/04/2017     FIT  07/24/2018     PHQ-2  01/01/2019     BMP  01/23/2019     A1C  03/07/2019     INFLUENZA VACCINE (1) 09/01/2019     LIPID  09/07/2019     MICROALBUMIN  09/07/2019     PREVENTIVE CARE VISIT  10/24/2019     DIABETIC FOOT EXAM  10/24/2019       No results found for: DENISHA Johnson CMA, CMA  October 23, 2019 1:08 PM

## 2019-10-23 NOTE — NURSING NOTE
"Injectable Influenza Immunization Documentation    1.  Has the patient received the information for the injectable influenza vaccine? YES     2. Is the patient 6 months of age or older? YES     3. Does the patient have any of the following contraindications?         Severe allergy to eggs? No     Severe allergic reaction to previous influenza vaccines? No   Severe allergy to latex? No       History of Guillain-[place] syndrome? No     Currently have a temperature greater than 100.4F? No        4.  Severely egg allergic patients should have flu vaccine eligibility assessed by an MD, RN, or pharmacist, and those who received flu vaccine should be observed for 15 min by an MD, RN, Pharmacist, Medical Technician, or member of clinic staff.\": YES    5. Latex-allergic patients should be given latex-free influenza vaccine Yes. Please reference the Vaccine latex table to determine if your clinic s product is latex-containing.       Vaccination given by TAMI Emanuel              Screening Questionnaire for Adult Immunization    Are you sick today?   No   Do you have allergies to medications, food, a vaccine component or latex?   No   Have you ever had a serious reaction after receiving a vaccination?   No   Do you have a long-term health problem with heart disease, lung disease, asthma, kidney disease, metabolic disease (e.g. diabetes), anemia, or other blood disorder?   No   Do you have cancer, leukemia, HIV/AIDS, or any other immune system problem?   No   In the past 3 months, have you taken medications that affect  your immune system, such as prednisone, other steroids, or anticancer drugs; drugs for the treatment of rheumatoid arthritis, Crohn s disease, or psoriasis; or have you had radiation treatments?   No   Have you had a seizure, or a brain or other nervous system problem?   No   During the past year, have you received a transfusion of blood or blood     products, or been given immune (gamma) globulin or antiviral " drug?   No   For women: Are you pregnant or is there a chance you could become        pregnant during the next month?   No   Have you received any vaccinations in the past 4 weeks?   No     Immunization questionnaire answers were all negative.        Per orders of Dr. Live Richards, injection of Typhoid given by TAMI Emanuel. Patient instructed to remain in clinic for 15 minutes afterwards, and to report any adverse reaction to me immediately.       Screening performed by TAMI Emanuel on 10/23/2019 at 2:14 PM.

## 2019-10-25 DIAGNOSIS — I10 ESSENTIAL HYPERTENSION, BENIGN: ICD-10-CM

## 2019-10-25 DIAGNOSIS — E11.9 TYPE 2 DIABETES MELLITUS WITHOUT COMPLICATION, WITHOUT LONG-TERM CURRENT USE OF INSULIN (H): ICD-10-CM

## 2019-10-25 RX ORDER — PROPRANOLOL HYDROCHLORIDE 160 MG/1
160 CAPSULE, EXTENDED RELEASE ORAL DAILY
Qty: 90 CAPSULE | Refills: 4 | Status: SHIPPED | OUTPATIENT
Start: 2019-10-25 | End: 2020-12-09

## 2019-10-25 RX ORDER — METFORMIN HCL 500 MG
1000 TABLET, EXTENDED RELEASE 24 HR ORAL 2 TIMES DAILY WITH MEALS
Qty: 360 TABLET | Refills: 4 | Status: SHIPPED | OUTPATIENT
Start: 2019-10-25 | End: 2020-12-09

## 2019-10-25 NOTE — TELEPHONE ENCOUNTER
Added refills to metformin and propranolol for mail order per Dr Richards.  Aishwarya Pollack RN  [facility]

## 2020-12-09 ENCOUNTER — MYC REFILL (OUTPATIENT)
Dept: FAMILY MEDICINE | Facility: CLINIC | Age: 55
End: 2020-12-09

## 2020-12-09 DIAGNOSIS — I10 ESSENTIAL HYPERTENSION, BENIGN: ICD-10-CM

## 2020-12-09 DIAGNOSIS — E11.9 TYPE 2 DIABETES MELLITUS WITHOUT COMPLICATION, WITHOUT LONG-TERM CURRENT USE OF INSULIN (H): ICD-10-CM

## 2020-12-11 RX ORDER — PROPRANOLOL HYDROCHLORIDE 160 MG/1
160 CAPSULE, EXTENDED RELEASE ORAL DAILY
Qty: 90 CAPSULE | Refills: 0 | Status: SHIPPED | OUTPATIENT
Start: 2020-12-11 | End: 2020-12-22

## 2020-12-11 RX ORDER — METFORMIN HCL 500 MG
1000 TABLET, EXTENDED RELEASE 24 HR ORAL 2 TIMES DAILY WITH MEALS
Qty: 360 TABLET | Refills: 0 | Status: SHIPPED | OUTPATIENT
Start: 2020-12-11 | End: 2020-12-22

## 2020-12-11 NOTE — TELEPHONE ENCOUNTER
Pt last seen 10/23/19, future appt 1/11/21 for physical     Medication is being filled for 1 time refill only due to:  Patient needs labs A1C, bmp. Future labs ordered no, as has appt in one month [facility] MD, he will order all needs . Patient needs to be seen because it has been more than one year since last visit.     OK per MD to do 1 90 day supply, as mail order and COVID and seeing MD in one month     Routing refill request to provider for review/approval because:  Drug interaction warning    Yesi Whittaker RN  December 11, 2020 8:51 AM

## 2020-12-14 ENCOUNTER — TELEPHONE (OUTPATIENT)
Dept: FAMILY MEDICINE | Facility: CLINIC | Age: 55
End: 2020-12-14

## 2020-12-14 DIAGNOSIS — E11.9 TYPE 2 DIABETES MELLITUS WITHOUT COMPLICATION, WITHOUT LONG-TERM CURRENT USE OF INSULIN (H): ICD-10-CM

## 2020-12-14 DIAGNOSIS — I10 ESSENTIAL HYPERTENSION, BENIGN: ICD-10-CM

## 2020-12-14 NOTE — TELEPHONE ENCOUNTER
[facility] Call Center    Phone Message    May a detailed message be left on voicemail: yes     Reason for Call: Medication Refill Request    Has the patient contacted the pharmacy for the refill? Yes     Name of medication being requested:   * metFORMIN (GLUCOPHAGE-XR) 500 MG 24 hr tablet  * propranolol ER (INDERAL LA) 160 MG 24 hr capsule    Provider who prescribed the medication: Live Richards    Pharmacy: [facility] - [address]    Date medication is needed: 12/14/2020       Action Taken: Message routed to:  [facility]    Travel Screening: Not Applicable

## 2020-12-22 RX ORDER — METFORMIN HCL 500 MG
1000 TABLET, EXTENDED RELEASE 24 HR ORAL 2 TIMES DAILY WITH MEALS
Qty: 360 TABLET | Refills: 0 | Status: SHIPPED | OUTPATIENT
Start: 2020-12-22 | End: 2021-02-25

## 2020-12-22 RX ORDER — PROPRANOLOL HYDROCHLORIDE 160 MG/1
160 CAPSULE, EXTENDED RELEASE ORAL DAILY
Qty: 90 CAPSULE | Refills: 0 | Status: SHIPPED | OUTPATIENT
Start: 2020-12-22 | End: 2021-02-25

## 2020-12-22 NOTE — TELEPHONE ENCOUNTER
I called the pharmacy - the do not have the same mailing address on file as we do, therefore no matter what prescription we send to them they are not able to fill it because it needs to be an exact match.     Called the patient. He does have a new address and provides an address in [place]. He reports this is the same address that [facility] [employer] has on file. His demographics was updated to reflect this change. New Rx will be sent to [facility].     Maryuri Pryor RN  12/22/20  2:03 PM

## 2020-12-22 NOTE — TELEPHONE ENCOUNTER
Pt is calling in to inform us [facility] never received anything from us back on dec 11th which is why pt is asking for a new RX.

## 2021-01-15 ENCOUNTER — HEALTH MAINTENANCE LETTER (OUTPATIENT)
Age: 56
End: 2021-01-15

## 2021-01-26 DIAGNOSIS — I10 ESSENTIAL HYPERTENSION: ICD-10-CM

## 2021-01-26 RX ORDER — LISINOPRIL 10 MG/1
10 TABLET ORAL DAILY
Qty: 90 TABLET | Refills: 0 | Status: SHIPPED | OUTPATIENT
Start: 2021-01-26 | End: 2021-02-25 | Stop reason: DRUGHIGH

## 2021-01-26 NOTE — TELEPHONE ENCOUNTER
Last visit 10/23/19, future visit 2/25/21  Medication is being filled for 1 time refill only due to:  Patient needs to be seen because it has been more than one year since last visit.   Aishwarya Pollack RN  [facility]

## 2021-02-25 ENCOUNTER — OFFICE VISIT (OUTPATIENT)
Dept: FAMILY MEDICINE | Facility: CLINIC | Age: 56
End: 2021-02-25
Payer: COMMERCIAL

## 2021-02-25 VITALS
RESPIRATION RATE: 15 BRPM | SYSTOLIC BLOOD PRESSURE: 166 MMHG | HEIGHT: 73 IN | OXYGEN SATURATION: 98 % | DIASTOLIC BLOOD PRESSURE: 95 MMHG | HEART RATE: 74 BPM | WEIGHT: 268.25 LBS | BODY MASS INDEX: 35.55 KG/M2 | TEMPERATURE: 97.1 F

## 2021-02-25 DIAGNOSIS — Z12.5 SCREENING FOR PROSTATE CANCER: ICD-10-CM

## 2021-02-25 DIAGNOSIS — Z13.220 SCREENING FOR LIPID DISORDERS: ICD-10-CM

## 2021-02-25 DIAGNOSIS — Z23 NEED FOR VACCINATION: ICD-10-CM

## 2021-02-25 DIAGNOSIS — L84 CORNS AND CALLOSITIES: ICD-10-CM

## 2021-02-25 DIAGNOSIS — Z12.11 SCREENING FOR COLON CANCER: ICD-10-CM

## 2021-02-25 DIAGNOSIS — M54.2 CERVICAL PAIN (NECK): ICD-10-CM

## 2021-02-25 DIAGNOSIS — E11.9 TYPE 2 DIABETES MELLITUS WITHOUT COMPLICATION, WITHOUT LONG-TERM CURRENT USE OF INSULIN (H): ICD-10-CM

## 2021-02-25 DIAGNOSIS — Z00.00 WELLNESS EXAMINATION: Primary | ICD-10-CM

## 2021-02-25 DIAGNOSIS — E66.01 MORBID OBESITY (H): ICD-10-CM

## 2021-02-25 DIAGNOSIS — I10 ESSENTIAL HYPERTENSION: ICD-10-CM

## 2021-02-25 DIAGNOSIS — Z20.822 EXPOSURE TO COVID-19 VIRUS: ICD-10-CM

## 2021-02-25 LAB
BUN SERPL-MCNC: 20 MG/DL (ref 7–30)
CALCIUM SERPL-MCNC: 9.4 MG/DL (ref 8.5–10.4)
CHLORIDE SERPLBLD-SCNC: 103 MMOL/L (ref 94–109)
CHOLEST SERPL-MCNC: 176 MG/DL (ref 0–200)
CHOLEST/HDLC SERPL: 3.4 {RATIO} (ref 0–5)
CO2 SERPL-SCNC: 28 MMOL/L (ref 20–32)
CREAT SERPL-MCNC: 0.9 MG/DL (ref 0.8–1.5)
CREAT UR-MCNC: 110 MG/DL
EGFR CALCULATED (BLACK REFERENCE): 113.1
EGFR CALCULATED (NON BLACK REFERENCE): 93.5
FASTING SPECIMEN: NO
GLUCOSE SERPL-MCNC: 142 MG/DL (ref 60–99)
HBA1C MFR BLD: 6.5 % (ref 4.1–5.7)
HDLC SERPL-MCNC: 51 MG/DL
LDLC SERPL CALC-MCNC: 103 MG/DL (ref 0–129)
MICROALBUMIN UR-MCNC: 18 MG/L
MICROALBUMIN/CREAT UR: 15.91 MG/G CR (ref 0–17)
POTASSIUM SERPL-SCNC: 4.5 MMOL/L (ref 3.4–5.3)
PSA SERPL-ACNC: 2.02 UG/L (ref 0–4)
SODIUM SERPL-SCNC: 145 MMOL/L (ref 137.3–146.3)
TRIGL SERPL-MCNC: 109 MG/DL (ref 0–150)
VLDL-CHOLESTEROL: 22 (ref 7–32)

## 2021-02-25 RX ORDER — PROPRANOLOL HYDROCHLORIDE 160 MG/1
160 CAPSULE, EXTENDED RELEASE ORAL DAILY
Qty: 90 CAPSULE | Refills: 4 | Status: SHIPPED | OUTPATIENT
Start: 2021-02-25 | End: 2022-03-25

## 2021-02-25 RX ORDER — CYCLOBENZAPRINE HCL 5 MG
TABLET ORAL
Qty: 20 TABLET | Refills: 1 | Status: SHIPPED | OUTPATIENT
Start: 2021-02-25 | End: 2021-09-10

## 2021-02-25 RX ORDER — METFORMIN HCL 500 MG
1000 TABLET, EXTENDED RELEASE 24 HR ORAL 2 TIMES DAILY WITH MEALS
Qty: 360 TABLET | Refills: 4 | Status: SHIPPED | OUTPATIENT
Start: 2021-02-25 | End: 2022-03-25

## 2021-02-25 RX ORDER — LISINOPRIL 20 MG/1
20 TABLET ORAL DAILY
Qty: 90 TABLET | Refills: 4 | Status: SHIPPED | OUTPATIENT
Start: 2021-02-25 | End: 2021-09-03

## 2021-02-25 SDOH — HEALTH STABILITY: MENTAL HEALTH: HOW OFTEN DO YOU HAVE 6 OR MORE DRINKS ON ONE OCCASION?: NOT ASKED

## 2021-02-25 SDOH — HEALTH STABILITY: MENTAL HEALTH: HOW OFTEN DO YOU HAVE A DRINK CONTAINING ALCOHOL?: 2-4 TIMES A MONTH

## 2021-02-25 SDOH — HEALTH STABILITY: MENTAL HEALTH: HOW OFTEN DO YOU HAVE SIX OR MORE DRINKS ON ONE OCCASION?: NOT ASKED

## 2021-02-25 SDOH — HEALTH STABILITY: MENTAL HEALTH: HOW MANY DRINKS CONTAINING ALCOHOL DO YOU HAVE ON A TYPICAL DAY WHEN YOU ARE DRINKING?: 1 OR 2

## 2021-02-25 SDOH — HEALTH STABILITY: MENTAL HEALTH: HOW MANY STANDARD DRINKS CONTAINING ALCOHOL DO YOU HAVE ON A TYPICAL DAY?: 1 OR 2

## 2021-02-25 ASSESSMENT — MIFFLIN-ST. JEOR: SCORE: 2110.65

## 2021-02-25 NOTE — NURSING NOTE
"55 year old  Chief Complaint   Patient presents with     Physical     Neck Problem     neck pain       Blood pressure (!) 167/92, pulse 78, temperature 97.1  F (36.2  C), temperature source Skin, resp. rate 15, height 1.854 m (6' 1\"), weight 121.7 kg (268 lb 4 oz), SpO2 98 %. Body mass index is 35.39 kg/m .  Patient Active Problem List   Diagnosis     Insomnia     Essential hypertension     Preventative health care     Controlled type 2 diabetes mellitus without complication, with long-term current use of insulin (H)       Wt Readings from Last 2 Encounters:   02/25/21 121.7 kg (268 lb 4 oz)   10/23/19 124.3 kg (274 lb 0.6 oz)     BP Readings from Last 3 Encounters:   02/25/21 (!) 167/92   10/23/19 (!) 159/96   10/24/18 (!) 153/94         Current Outpatient Medications   Medication     Aspirin 81 MG TBDP     blood glucose monitoring (NO BRAND SPECIFIED) meter device kit     blood glucose monitoring (NO BRAND SPECIFIED) test strip     Ginkgo Biloba (GINKGO PO)     Ginseng (ELEUTHERO PO)     insulin NPH (HUMULIN N/NOVOLIN N VIAL) 100 UNIT/ML vial     Lancets Misc. (MULTI-LANCET DEVICE 2) KIT     lisinopril (ZESTRIL) 10 MG tablet     metFORMIN (GLUCOPHAGE-XR) 500 MG 24 hr tablet     NEEDLES, ANY SIZE     propranolol ER (INDERAL LA) 160 MG 24 hr capsule     zolpidem (AMBIEN) 10 MG tablet     No current facility-administered medications for this visit.        Social History     Tobacco Use     Smoking status: Never Smoker     Smokeless tobacco: Never Used   Substance Use Topics     Alcohol use: Yes     Frequency: 2-4 times a month     Drinks per session: 1 or 2     Comment: rarely     Drug use: No       Health Maintenance Due   Topic Date Due     ADVANCE CARE PLANNING  1965     HIV SCREENING  04/04/1981     HEPATITIS C SCREENING  04/04/1984     ZOSTER IMMUNIZATION (1 of 2) 04/04/2016     EYE EXAM  11/05/2016     Pneumococcal Vaccine: Pediatrics (0 to 5 Years) and At-Risk Patients (6 to 64 Years) (1 of 2 - PPSV23) " 08/04/2017     COLORECTAL CANCER SCREENING  07/24/2018     A1C  04/23/2020     PREVENTIVE CARE VISIT  10/23/2020     BMP  10/23/2020     LIPID  10/23/2020     MICROALBUMIN  10/23/2020     DIABETIC FOOT EXAM  10/23/2020       No results found for: PAP      February 25, 2021 8:42 AM

## 2021-02-25 NOTE — PROGRESS NOTES
CHIEF COMPLAINT:  Annual wellness visit and diabetic followup.      HISTORY OF PRESENT ILLNESS:  Dougie is a 55-year-old here for the above.  Overall, he is doing quite well.  He is still traveling a bit with work.  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  However, his office is at home.  His wife is there and they have 4 children.  He has 17-year-old twin daughters and one of them has been struggling with depression and recently started cutting herself.  This has added a lot of stress for him.      Importantly, he lost his sense of taste and smell in December and that lasted for about a month.  Now everything is back.  He likely had COVID infection.  He had no other symptoms at all, which is fortunate due to how many comorbidities he has.  His wife and children did not complain of any symptoms.  Of course, if he tests positive today for COVID antibodies, he will share that with his family and decide how they want to proceed.      He has type 2 diabetes without complication, with long-term current use of insulin.  He thinks his sugar levels will look fairly good today.  Unfortunately, his blood pressure is very high.  It was 167/92 and then 166/95.  He is on lisinopril 10 mg daily.  We both agreed today that we are going to increase that to 20 mg daily.  He is also on propranolol  mg once daily.  He is on aspirin.  He does not smoke.  LDL has always been less than 100.  He would prefer not to be on a statin at this point, but we may add that next time.  Typically, he is 1 ne point shy of having optimal diabetic care, only because of his high blood pressure.      HEALTHCARE MAINTENANCE:  From an eye care standpoint, generally up-to-date.  Hearing is unchanged.  Dental care up-to-date.      IMMUNIZATION STATUS:  We discussed this.  He is in 2 registries for the COVID vaccine.  Because of his comorbidities, he should be available once they go below 65.  He is due for a PSA 50.   Diabetic labs will be checked.  Cirilo for colon cancer screening purposes.  He technically needs a pneumococcal immunization, as well as shingles vaccine.  We are going to hold off on both of those until we get on the other side of the pandemic.  He completely agrees with that plan.      OBJECTIVE:  Dougie is in no distress.  Affect upbeat.  /92 and then 166/95.  Pulse 78 and regular.  Temperature is 97.1.  He is 6 feet 1 inch and weighs 268 pounds 4 ounces with a BMI of 35.39.  O2 sats are 98% on room air.   HEENT:  Head is normocephalic, atraumatic.  The ears are free of any cerumen.  The TMs look fine.  There is no pain with palpation of the frontal or maxillary sinuses.  Eyes are grossly normal.  Wearing a mask.   NECK:  Supple without any anterior or posterior chain adenopathy.  No visible or palpable thyromegaly.  No carotid bruits are heard.   LUNGS:  Clear to auscultation bilaterally.   HEART:  Regular rate and rhythm without murmur.   ABDOMEN:  Obese.   EXTREMITIES:  Ankles are free of any edema.  Importantly, he has significant corn formation on the bottom of his right foot, but there is also some on the bottom of his left foot.  Right foot actually hurts to walk at times.  I do not have time to pare anything back today, but he will come back for a separate appointment for that purpose.  There could be some plantar warts present as well and we will know that once we start to pare some of the callused skin back.  Monofilament testing was carefully performed on all 10 toes, tops and bottoms, and were perfectly normal with that testing.      ASSESSMENT/PLAN:   1.  Annual wellness visit, up-to-date with most healthcare maintenance strategies.   2.  From a type 2 diabetic standpoint, A1c should be at goal; blood pressure is not at goal, so we are going to double his lisinopril from 10 to 20 mg; LDL has always been less than 100 and we will see where it is at today; may add statin next time; on aspirin  81 mg daily; no tobacco use.   3.  Need for both a pneumococcal 23 vaccine and shingles vaccine.  These will be given on the other side of his COVID vaccine.   4.  Lipid panel pending.   5.  PSA 50 pending for prostate cancer screening purposes.   6.  Cologuard test for colon cancer screening purposes.   7.  Probable COVID-19 infection.  Antibody pending.  His sense of taste and smell have completely returned to normal at this point.   8.  Morbid obesity.  He will try and become more active when he can.   9.  Not described above, but he often has neck pain at night.  He has to turn his neck to look at the computer monitor even though his keyboard is more in front of him.  He has absolutely no  numbness or tingling going down the right arm.  Therefore, we will try cyclobenzaprine 5 mg, quantity 20 pills, 1 at bedtime as needed.   10.  Essential hypertension, under poor control.  We are going to double his lisinopril from 10 to 20 mg once daily.  Continue with the propranolol  mg once daily.   11.  For his corns, he will return to clinic for paring of them with me at his convenience.      Call with any problems or questions.          no

## 2021-03-03 LAB
COLOGUARD-ABSTRACT: NEGATIVE
SARS-COV-2 AB PNL SERPL IA: POSITIVE
SARS-COV-2 IGG SERPL IA-ACNC: NORMAL

## 2021-03-05 ENCOUNTER — OFFICE VISIT (OUTPATIENT)
Dept: FAMILY MEDICINE | Facility: CLINIC | Age: 56
End: 2021-03-05
Payer: COMMERCIAL

## 2021-03-05 VITALS
DIASTOLIC BLOOD PRESSURE: 97 MMHG | HEART RATE: 76 BPM | RESPIRATION RATE: 15 BRPM | SYSTOLIC BLOOD PRESSURE: 162 MMHG | TEMPERATURE: 97.2 F | OXYGEN SATURATION: 97 %

## 2021-03-05 DIAGNOSIS — M79.672 BILATERAL FOOT PAIN: ICD-10-CM

## 2021-03-05 DIAGNOSIS — M79.671 BILATERAL FOOT PAIN: ICD-10-CM

## 2021-03-05 DIAGNOSIS — L84 CORNS AND CALLOSITIES: Primary | ICD-10-CM

## 2021-03-05 DIAGNOSIS — I10 ESSENTIAL HYPERTENSION, BENIGN: ICD-10-CM

## 2021-03-05 NOTE — NURSING NOTE
55 year old  Chief Complaint   Patient presents with     Foot Problems     corn removals on both feet       Blood pressure (!) 162/97, pulse 82, temperature 97.2  F (36.2  C), temperature source Skin, resp. rate 15, SpO2 97 %. There is no height or weight on file to calculate BMI.  Patient Active Problem List   Diagnosis     Insomnia     Essential hypertension, benign     Wellness examination     Type 2 diabetes mellitus without complication, without long-term current use of insulin (H)     Morbid obesity (H)       Wt Readings from Last 2 Encounters:   02/25/21 121.7 kg (268 lb 4 oz)   10/23/19 124.3 kg (274 lb 0.6 oz)     BP Readings from Last 3 Encounters:   03/05/21 (!) 162/97   02/25/21 (!) 166/95   10/23/19 (!) 159/96         Current Outpatient Medications   Medication     Aspirin 81 MG TBDP     blood glucose monitoring (NO BRAND SPECIFIED) meter device kit     blood glucose monitoring (NO BRAND SPECIFIED) test strip     cyclobenzaprine (FLEXERIL) 5 MG tablet     Ginkgo Biloba (GINKGO PO)     Ginseng (ELEUTHERO PO)     insulin NPH (HUMULIN N/NOVOLIN N VIAL) 100 UNIT/ML vial     Lancets Misc. (MULTI-LANCET DEVICE 2) KIT     lisinopril (ZESTRIL) 20 MG tablet     metFORMIN (GLUCOPHAGE-XR) 500 MG 24 hr tablet     NEEDLES, ANY SIZE     propranolol ER (INDERAL LA) 160 MG 24 hr capsule     zolpidem (AMBIEN) 10 MG tablet     No current facility-administered medications for this visit.        Social History     Tobacco Use     Smoking status: Never Smoker     Smokeless tobacco: Never Used   Substance Use Topics     Alcohol use: Yes     Frequency: 2-4 times a month     Drinks per session: 1 or 2     Comment: rarely     Drug use: No       Health Maintenance Due   Topic Date Due     ADVANCE CARE PLANNING  Never done     HIV SCREENING  Never done     HEPATITIS C SCREENING  Never done     ZOSTER IMMUNIZATION (1 of 2) Never done     EYE EXAM  11/05/2016     Pneumococcal Vaccine: Pediatrics (0 to 5 Years) and At-Risk Patients  (6 to 64 Years) (1 of 2 - PPSV23) 08/04/2017     COLORECTAL CANCER SCREENING  07/24/2018       No results found for: PAP      March 5, 2021 9:30 AM

## 2021-03-07 NOTE — PROGRESS NOTES
CHIEF COMPLAINT:  Bilateral foot pain due to corns.      HISTORY OF PRESENT ILLNESS:  Dougie is a 55 year old who was here last week for his wellness visit.  During the course of that, he indicated to me that he is having pain with ambulation.  He has developed some very thick calluses on the bottom of his feet, and he wondered if he might have some warts.  At that visit, my initial assessment is that he had significant corns.  He is back today to have that treated.        In addition, we briefly reviewed Dougie's labs.  For the most part, things look good.  The biggest concern at this point is his blood pressure.  He needs to be checking at home, and then we need to consider adding something in addition to his propranolol  mg daily and lisinopril 20 mg daily.  He agrees with that.      ACTIVE MEDICAL PROBLEMS:  Reviewed.      CURRENT MEDICATIONS:  Reviewed.      OBJECTIVE:  Dougie is in no distress.  Blood pressure is 162/97 with a pulse of 82 and regular.  Temperature is 97.2.  O2 sats are 97% on room air.  Examination of the bottom of his feet reveals very thick calluses bilaterally.  I had Dougie get on the exam table, and we were able to extend his feet with support.  Using a #15 blade, I was able to pare back a great deal of the callous formation.  He had a significant corn on the bottom of his right foot.  The nidus was able to be removed.  Elsewhere, a significant callous formation was removed.  There was no evidence of any wart activity.  When I was done, Dougie was able to walk and instantaneously knew that the pain was markedly lessened.  He is extremely grateful for this.      ASSESSMENT/PLAN:     1.  Corns and callosities, bilateral feet.  This was causing significant pain and alteration of his gait.  As soon as we were done and he put his shoes and socks on, he could then walk normally.     2.  Essential hypertension that is not well controlled on current medications.  He also has type 2 diabetes.  He is  going to check his blood pressure at home and send in some numbers.  If they are over 140/90, we will add an additional medication.  Options discussed.  Call with any questions or concerns.

## 2021-04-07 ENCOUNTER — IMMUNIZATION (OUTPATIENT)
Dept: NURSING | Facility: CLINIC | Age: 56
End: 2021-04-07
Payer: COMMERCIAL

## 2021-04-07 PROCEDURE — 0001A PR COVID VAC PFIZER DIL RECON 30 MCG/0.3 ML IM: CPT

## 2021-04-07 PROCEDURE — 91300 PR COVID VAC PFIZER DIL RECON 30 MCG/0.3 ML IM: CPT

## 2021-04-28 ENCOUNTER — IMMUNIZATION (OUTPATIENT)
Dept: NURSING | Facility: CLINIC | Age: 56
End: 2021-04-28
Attending: INTERNAL MEDICINE
Payer: COMMERCIAL

## 2021-04-28 PROCEDURE — 91300 PR COVID VAC PFIZER DIL RECON 30 MCG/0.3 ML IM: CPT

## 2021-04-28 PROCEDURE — 0002A PR COVID VAC PFIZER DIL RECON 30 MCG/0.3 ML IM: CPT

## 2021-04-28 NOTE — PROGRESS NOTES
Patient presented to the [facility] for their second dose of the Pfizer COVID-19 vaccine. Patient's immunization record had documentation from the [facility] showing that the patient received the Moderna COVID-19 vaccine on 3/31/21. Patient declined ever receiving the Moderna COVID-19 vaccine and stated that he had only received the Pfizer COVID-19 vaccine with today's visit being the second dose of the 2 part series. Patient name, , and address confirmed with the patient, immunization record reconciled, Moderna immunization deleted from record, and second dose of the Pfizer COVID-19 vaccine administered and documented by the vaccinator. See immunization record.   Felipa Piper, RN

## 2021-07-28 ENCOUNTER — CARE COORDINATION (OUTPATIENT)
Dept: FAMILY MEDICINE | Facility: CLINIC | Age: 56
End: 2021-07-28

## 2021-08-29 ENCOUNTER — HEALTH MAINTENANCE LETTER (OUTPATIENT)
Age: 56
End: 2021-08-29

## 2021-09-03 DIAGNOSIS — I10 ESSENTIAL HYPERTENSION: ICD-10-CM

## 2021-09-03 DIAGNOSIS — E11.9 TYPE 2 DIABETES MELLITUS WITHOUT COMPLICATION, WITHOUT LONG-TERM CURRENT USE OF INSULIN (H): ICD-10-CM

## 2021-09-03 RX ORDER — LISINOPRIL 20 MG/1
20 TABLET ORAL DAILY
Qty: 30 TABLET | Refills: 0 | Status: SHIPPED | OUTPATIENT
Start: 2021-09-03 | End: 2021-09-10

## 2021-09-03 NOTE — TELEPHONE ENCOUNTER
"Last visit 3/5/21, future visit 9/10/21.  See [place] message to dr nolasco - called pt - states he started taking two lisinopril 20 mg tabs \" 2 or 3 months ago\" due to continued high BP. He did not ask Dr Tam about this. States 's/80's-90's most of the time. Agreed to refill #30 lisinopril 20 mg tabs today - he will be in clinic on 9/10/21.  Aishwarya Pollack RN  [facility]  "

## 2021-09-10 ENCOUNTER — OFFICE VISIT (OUTPATIENT)
Dept: FAMILY MEDICINE | Facility: CLINIC | Age: 56
End: 2021-09-10
Payer: COMMERCIAL

## 2021-09-10 VITALS
DIASTOLIC BLOOD PRESSURE: 94 MMHG | OXYGEN SATURATION: 98 % | TEMPERATURE: 97.2 F | HEART RATE: 74 BPM | BODY MASS INDEX: 34.89 KG/M2 | WEIGHT: 263.25 LBS | SYSTOLIC BLOOD PRESSURE: 155 MMHG | HEIGHT: 73 IN | RESPIRATION RATE: 15 BRPM

## 2021-09-10 DIAGNOSIS — E11.9 TYPE 2 DIABETES MELLITUS WITHOUT COMPLICATION, WITHOUT LONG-TERM CURRENT USE OF INSULIN (H): Primary | ICD-10-CM

## 2021-09-10 DIAGNOSIS — I10 ESSENTIAL HYPERTENSION, BENIGN: ICD-10-CM

## 2021-09-10 LAB
ANION GAP SERPL CALCULATED.3IONS-SCNC: 10 MMOL/L (ref 3–14)
BUN SERPL-MCNC: 17 MG/DL (ref 7–30)
CALCIUM SERPL-MCNC: 9 MG/DL (ref 8.5–10.1)
CHLORIDE BLD-SCNC: 107 MMOL/L (ref 94–109)
CO2 SERPL-SCNC: 25 MMOL/L (ref 20–32)
CREAT SERPL-MCNC: 0.76 MG/DL (ref 0.66–1.25)
GFR SERPL CREATININE-BSD FRML MDRD: >90 ML/MIN/1.73M2
GLUCOSE BLD-MCNC: 122 MG/DL (ref 70–99)
HBA1C MFR BLD: 7.1 % (ref 0–5.6)
POTASSIUM BLD-SCNC: 4.4 MMOL/L (ref 3.4–5.3)
SODIUM SERPL-SCNC: 142 MMOL/L (ref 133–144)

## 2021-09-10 RX ORDER — LISINOPRIL 40 MG/1
40 TABLET ORAL DAILY
Qty: 90 TABLET | Refills: 4 | Status: SHIPPED | OUTPATIENT
Start: 2021-09-10 | End: 2021-12-09

## 2021-09-10 RX ORDER — HYDROCHLOROTHIAZIDE 12.5 MG/1
12.5 TABLET ORAL DAILY
Qty: 90 TABLET | Refills: 1 | Status: SHIPPED | OUTPATIENT
Start: 2021-09-10 | End: 2021-12-24

## 2021-09-10 ASSESSMENT — MIFFLIN-ST. JEOR: SCORE: 2077.22

## 2021-09-10 NOTE — NURSING NOTE
"56 year old  Chief Complaint   Patient presents with     Hypertension     check in      Diabetes     check in        Blood pressure (!) 156/89, pulse 74, temperature 97.2  F (36.2  C), temperature source Skin, resp. rate 15, height 1.845 m (6' 0.64\"), weight 119.4 kg (263 lb 4 oz), SpO2 98 %. Body mass index is 35.08 kg/m .  Patient Active Problem List   Diagnosis     Insomnia     Essential hypertension, benign     Wellness examination     Type 2 diabetes mellitus without complication, without long-term current use of insulin (H)     Morbid obesity (H)       Wt Readings from Last 2 Encounters:   09/10/21 119.4 kg (263 lb 4 oz)   02/25/21 121.7 kg (268 lb 4 oz)     BP Readings from Last 3 Encounters:   09/10/21 (!) 156/89   03/05/21 (!) 162/97   02/25/21 (!) 166/95         Current Outpatient Medications   Medication     Aspirin 81 MG TBDP     blood glucose monitoring (NO BRAND SPECIFIED) meter device kit     blood glucose monitoring (NO BRAND SPECIFIED) test strip     Ginkgo Biloba (GINKGO PO)     Ginseng (ELEUTHERO PO)     insulin NPH (HUMULIN N/NOVOLIN N VIAL) 100 UNIT/ML vial     Lancets Misc. (MULTI-LANCET DEVICE 2) KIT     lisinopril (ZESTRIL) 20 MG tablet     metFORMIN (GLUCOPHAGE-XR) 500 MG 24 hr tablet     NEEDLES, ANY SIZE     propranolol ER (INDERAL LA) 160 MG 24 hr capsule     zolpidem (AMBIEN) 10 MG tablet     cyclobenzaprine (FLEXERIL) 5 MG tablet     No current facility-administered medications for this visit.       Social History     Tobacco Use     Smoking status: Never Smoker     Smokeless tobacco: Never Used   Substance Use Topics     Alcohol use: Yes     Comment: rarely     Drug use: No       Health Maintenance Due   Topic Date Due     ADVANCE CARE PLANNING  Never done     HIV SCREENING  Never done     HEPATITIS C SCREENING  Never done     EYE EXAM  11/05/2016     Pneumococcal Vaccine: Pediatrics (0 to 5 Years) and At-Risk Patients (6 to 64 Years) (1 of 2 - PPSV23) 08/04/2017     A1C  08/25/2021 "     INFLUENZA VACCINE (1) 09/01/2021     ZOSTER IMMUNIZATION (2 of 2) 09/18/2021       No results found for: PAP      September 10, 2021 9:42 AM

## 2021-09-10 NOTE — PROGRESS NOTES
"CHIEF COMPLAINT: F/u on T2D and HTN      HISTORY OF PRESENT ILLNESS:  Dougie Nino is a 56 year old male with a history of T2D and HTN who presents for a follow-up on his diabetes and BP.    Dougie has type 2 diabetes without complication. He is on insulin and notes that his blood sugars may be high today, and Shasha Mckinney will reach out to him if his results today are elevated. He is typically one point shy of optimal diabetic care due to high BP. His A1c was 6.5% on 2/25/21. He takes metformin 1000 mg BID.  Today, his BP is elevated at 156/89. He takes lisinopril 40 mg daily and propranolol  mg daily. His LDL was 103 on 2/25/21. Dougie has deferred starting a statin medication in the past. He is on aspirin 81 mg daily and does not smoke.     Dougie has a history of hypertension. As mentioned above, he takes lisinopril and propranolol ER daily. He checks his BP regularly at home, and notes that today's BP reading is about average for him. He increased his dose of lisinopril to 40 mg about 6 months ago and has been consistent on this dose. Given his elevated BP, we discussed adding a diuretic pill, to which Dougie agreed. He reports occasional peripheral edema in his legs related to travel.     Dougie has lost around 5 pounds, which he notes is unintentional in terms of making \"conscious changes\". He has been much more active over the summer. He denies any back pain, and notes that he has reduced his ibuprofen intake.     Dougie notes that he has a \"doppelganger\" that lives up in [place] and has his exact name and birth date. We discussed this in the context of his COVID-19 vaccines as his records show two Pfizer vaccines and one Moderna vaccine. Dougie reports that the Moderna vaccine is incorrect, which we will remove from his immunizations. Dougie received his first Shingles vaccine from his pharmacy on 7/24/21 and will received the second dose after 9/24/21. Dougie completed a Cologuard test on 3/3/21, which was " "negative. We discussed this result today.       MEDICATIONS:   Carefully Reviewed      REVIEW OF SYSTEMS:  10-point review of systems negative unless otherwise stated in the HPI.       OBJECTIVE:    EXAM:  GENERAL: Dougie is in no distress.  Affect is upbeat.   Alert and oriented x3  BP (!) 156/89 (BP Location: Right arm, Patient Position: Sitting, Cuff Size: Adult Large)   Pulse 74   Temp 97.2  F (36.2  C) (Skin)   Resp 15   Ht 1.845 m (6' 0.64\")   Wt 119.4 kg (263 lb 4 oz)   SpO2 98%   BMI 35.08 kg/m    HEENT:  Head is normocephalic, atraumatic.  Eyes are grossly normal.  Nose and mouth masked.   EXTREMITIES:  Ankles free of any edema.   SKIN:  Warm and dry.       LABORATORY DATA:   No results found for any visits on 09/10/21.      ASSESSMENT AND PLAN:   1. Essential hypertension, benign: After discussing Dougie's elevated BP and additional medication options, he will continue taking lisinopril 40 mg daily and add hydrochlorothiazide 12.5 mg daily in the mornings. To assess for any side effects, we will not add any additional new interventions at this time.  If he tolerates the hydrochlorothiazide well, we can combine it with the lisinopril into a single pill. BMP completed today, results pending.   2. Type 2 diabetes mellitus without complication, without long-term current use of insulin: A1c completed today, results pending. He uses insulin. Blood pressure is greater than 140/90 and he is starting hydrochlorothiazide, as above. LDL was 103 on 2/25/21. He is on aspirin and does not smoke. Therefore, he is at mostly optimal diabetic care.      IChasidy, am serving as a scribe to document services personally performed by Dr. Live Richards, based on data collection and the provider's statements to me.     Attestation: I, Dr. Live Richards, have reviewed, edited, and approved the above note.       "

## 2021-10-24 ENCOUNTER — HEALTH MAINTENANCE LETTER (OUTPATIENT)
Age: 56
End: 2021-10-24

## 2021-12-09 DIAGNOSIS — I10 ESSENTIAL HYPERTENSION, BENIGN: ICD-10-CM

## 2021-12-09 RX ORDER — LISINOPRIL 40 MG/1
40 TABLET ORAL DAILY
Qty: 90 TABLET | Refills: 2 | Status: SHIPPED | OUTPATIENT
Start: 2021-12-09 | End: 2022-08-09

## 2021-12-09 NOTE — PROGRESS NOTES
Change in pharmacies - now to [facility] home delivery  Maryuri Pryor MS RN-BC  12/09/21  12:14 PM

## 2021-12-24 DIAGNOSIS — I10 ESSENTIAL HYPERTENSION, BENIGN: ICD-10-CM

## 2021-12-24 RX ORDER — HYDROCHLOROTHIAZIDE 12.5 MG/1
12.5 TABLET ORAL DAILY
Qty: 90 TABLET | Refills: 0 | Status: SHIPPED | OUTPATIENT
Start: 2021-12-24 | End: 2022-03-03

## 2021-12-24 NOTE — TELEPHONE ENCOUNTER
Hydrochlorothiazide (Hydrodiuril) 12.5 mg    Last Office Visit: 9/10/21  [facility] Appointments: None  Medication last refilled: 9/10/21 #90 with 1 refill(s)    Vital Signs 2/25/2021 3/5/2021 9/10/2021   Systolic 166 162 156   Diastolic 95 97 89     Required labs per protocol:    DRUG REF RANGE 2/25/21 9/10/21   Creatinine 0.8-1.25 mg/dL 0.9 0.76   Potassium 3.4-5.3 mmol/L 4.5 4.4   Sodium 137.3-146.3 mmol/L 145 142     Prescription approved per [facility] Refill Protocol.    Pat Casey, RN, BSN

## 2021-12-24 NOTE — TELEPHONE ENCOUNTER
Last office visit was on 09/10/2021, no future visits scheduled.  Last filled on 09/10/2021 with 90 tablets and 1 refill.

## 2022-01-31 ENCOUNTER — TELEPHONE (OUTPATIENT)
Dept: FAMILY MEDICINE | Facility: CLINIC | Age: 57
End: 2022-01-31
Payer: COMMERCIAL

## 2022-03-03 DIAGNOSIS — I10 ESSENTIAL HYPERTENSION, BENIGN: ICD-10-CM

## 2022-03-03 RX ORDER — HYDROCHLOROTHIAZIDE 12.5 MG/1
12.5 TABLET ORAL DAILY
Qty: 30 TABLET | Refills: 0 | Status: SHIPPED | OUTPATIENT
Start: 2022-03-03 | End: 2022-03-25

## 2022-03-03 NOTE — TELEPHONE ENCOUNTER
Medication requested: hydrochlorothiazide (HYDRODIURIL) 12.5 MG tablet  Last office visit: 9/10/21  [facility] appointments: none  Medication last refilled: 12/24/21  Last qualifying labs:   Component      Latest Ref Rng & Units 9/10/2021   Sodium      133 - 144 mmol/L 142   Potassium      3.4 - 5.3 mmol/L 4.4   Chloride      94 - 109 mmol/L 107   Carbon Dioxide      20 - 32 mmol/L 25   Anion Gap      3 - 14 mmol/L 10   Urea Nitrogen      7 - 30 mg/dL 17   Creatinine      0.66 - 1.25 mg/dL 0.76   Calcium      8.5 - 10.1 mg/dL 9.0   Glucose      70 - 99 mg/dL 122 (H)   GFR Estimate      >60 mL/min/1.73m2 >90     Medication is being filled for 1 time refill only due to:  Patient needs to be seen because he is due for labs and BP + diabetes follow up.    Will send pt [employer] message to encourage scheduling appointment.    Alhaji BURCIAGA, RN  03/03/22 3:30 PM

## 2022-03-25 ENCOUNTER — OFFICE VISIT (OUTPATIENT)
Dept: FAMILY MEDICINE | Facility: CLINIC | Age: 57
End: 2022-03-25
Payer: COMMERCIAL

## 2022-03-25 VITALS
TEMPERATURE: 97.6 F | SYSTOLIC BLOOD PRESSURE: 146 MMHG | BODY MASS INDEX: 35.92 KG/M2 | HEIGHT: 73 IN | OXYGEN SATURATION: 98 % | HEART RATE: 84 BPM | WEIGHT: 271.04 LBS | DIASTOLIC BLOOD PRESSURE: 87 MMHG

## 2022-03-25 DIAGNOSIS — I10 ESSENTIAL HYPERTENSION, BENIGN: ICD-10-CM

## 2022-03-25 DIAGNOSIS — G47.00 INSOMNIA, UNSPECIFIED TYPE: ICD-10-CM

## 2022-03-25 DIAGNOSIS — E11.9 TYPE 2 DIABETES MELLITUS WITHOUT COMPLICATION, WITHOUT LONG-TERM CURRENT USE OF INSULIN (H): ICD-10-CM

## 2022-03-25 DIAGNOSIS — Z13.6 SCREENING FOR CARDIOVASCULAR CONDITION: ICD-10-CM

## 2022-03-25 DIAGNOSIS — E11.9 TYPE 2 DIABETES MELLITUS WITHOUT COMPLICATION, WITHOUT LONG-TERM CURRENT USE OF INSULIN (H): Primary | ICD-10-CM

## 2022-03-25 DIAGNOSIS — Z91.89 AT RISK FOR CARDIOVASCULAR EVENT: ICD-10-CM

## 2022-03-25 LAB
ALBUMIN SERPL-MCNC: 3.9 G/DL (ref 3.4–5)
ALP SERPL-CCNC: 72 U/L (ref 40–150)
ALT SERPL W P-5'-P-CCNC: 45 U/L (ref 0–70)
ANION GAP SERPL CALCULATED.3IONS-SCNC: 10 MMOL/L (ref 3–14)
AST SERPL W P-5'-P-CCNC: 25 U/L (ref 0–45)
BILIRUB SERPL-MCNC: 0.4 MG/DL (ref 0.2–1.3)
BUN SERPL-MCNC: 17 MG/DL (ref 7–30)
CALCIUM SERPL-MCNC: 9.5 MG/DL (ref 8.5–10.1)
CHLORIDE BLD-SCNC: 105 MMOL/L (ref 94–109)
CHOLEST SERPL-MCNC: 208 MG/DL
CO2 SERPL-SCNC: 27 MMOL/L (ref 20–32)
CREAT SERPL-MCNC: 0.85 MG/DL (ref 0.66–1.25)
FASTING STATUS PATIENT QL REPORTED: YES
GFR SERPL CREATININE-BSD FRML MDRD: >90 ML/MIN/1.73M2
GLUCOSE BLD-MCNC: 136 MG/DL (ref 70–99)
HBA1C MFR BLD: 7.3 % (ref 0–5.6)
HDLC SERPL-MCNC: 45 MG/DL
LDLC SERPL CALC-MCNC: 105 MG/DL
NONHDLC SERPL-MCNC: 163 MG/DL
POTASSIUM BLD-SCNC: 4.2 MMOL/L (ref 3.4–5.3)
PROT SERPL-MCNC: 7.4 G/DL (ref 6.8–8.8)
SODIUM SERPL-SCNC: 142 MMOL/L (ref 133–144)
TRIGL SERPL-MCNC: 290 MG/DL

## 2022-03-25 PROCEDURE — 80053 COMPREHEN METABOLIC PANEL: CPT | Performed by: FAMILY MEDICINE

## 2022-03-25 PROCEDURE — 82040 ASSAY OF SERUM ALBUMIN: CPT | Performed by: FAMILY MEDICINE

## 2022-03-25 PROCEDURE — 83718 ASSAY OF LIPOPROTEIN: CPT | Performed by: FAMILY MEDICINE

## 2022-03-25 RX ORDER — METFORMIN HCL 500 MG
1000 TABLET, EXTENDED RELEASE 24 HR ORAL 2 TIMES DAILY WITH MEALS
Qty: 360 TABLET | Refills: 0 | Status: SHIPPED | OUTPATIENT
Start: 2022-03-25 | End: 2022-04-15

## 2022-03-25 RX ORDER — HYDROCHLOROTHIAZIDE 12.5 MG/1
12.5 TABLET ORAL DAILY
Qty: 90 TABLET | Refills: 0 | Status: SHIPPED | OUTPATIENT
Start: 2022-03-25 | End: 2022-05-17

## 2022-03-25 RX ORDER — SYRINGE-NEEDLE,INSULIN,0.5 ML 27GX1/2"
SYRINGE, EMPTY DISPOSABLE MISCELLANEOUS
Qty: 100 EACH | Refills: 3 | Status: SHIPPED | OUTPATIENT
Start: 2022-03-25

## 2022-03-25 RX ORDER — ZOLPIDEM TARTRATE 10 MG/1
10 TABLET ORAL
Qty: 30 TABLET | Refills: 0 | Status: SHIPPED | OUTPATIENT
Start: 2022-03-25 | End: 2022-10-31

## 2022-03-25 RX ORDER — PROPRANOLOL HYDROCHLORIDE 160 MG/1
160 CAPSULE, EXTENDED RELEASE ORAL DAILY
Qty: 90 CAPSULE | Refills: 0 | Status: SHIPPED | OUTPATIENT
Start: 2022-03-25 | End: 2022-05-27

## 2022-03-25 NOTE — PROGRESS NOTES
OVERVIEW: Dougie Nino is a 56 year old male who presents to [facility] today for Hypertension and Diabetes        ASSESSMENT/PLAN:    1. Dougie is a 57 yo DM2 (on insulin and glucophage) and htn who has been followed by Dr. Richards for over 20 years   - Will have nurses arrange for needle refills and give an additional 30 tabs of hydrochlorothiazide (12.5mg/day)  - Discussed increasing the hydrochlorothiazide, but deferred as Dougie is at a high body weight.  - He will try to lower body weight. Suggested standardizing morning meals and including green veggies in that.   - *Schedule follow up annual exam with Dr. Richards within the next 1-2 months.     ADDENDUM: After Dougie left, I was informed that he is not getting his visit with Dr. Richards for several months.  Because of this, I went ahead and ordered labs for A1c, comprehensive panel and lipids.  I will send Dougie a note to schedule a lab only visit at his convenience in the next few weeks.  This should ideally be done fasting since he is able to schedule first thing in the morning.    --Rory Ponce MD      SUBJECTIVE:   This is my first time meeting Dougie.  His main reason for coming today was that he needed a refill on his needles and he has not seen his primary physician, Dr. Richards recently.     He's had DM2 for about 5-6 years.   Takes Glucophage -XR.  Also takes insulin about 16 units twice daily.  He obtains his insulin over-the-counter at [facility].    Also, has HTN:   On Propranolol 160 mg/day LA.   And, Zestril 40mg/day   And, hydrochlorothiazide 12.5 mg/day    Wt Readings from Last 5 Encounters:   03/25/22 122.9 kg (271 lb 0.6 oz)   09/10/21 119.4 kg (263 lb 4 oz)   02/25/21 121.7 kg (268 lb 4 oz)   10/23/19 124.3 kg (274 lb 0.6 oz)   10/24/18 120.8 kg (266 lb 4 oz)         Review Of Systems:    Has otherwise been in usual state of health, e.g.   Cardiovascular: negative  Respiratory: No shortness of breath, dyspnea on exertion, cough, or  "hemoptysis  Gastrointestinal: negative  Genitourinary: negative    Problem list per EMR:  Patient Active Problem List   Diagnosis     Insomnia     Essential hypertension, benign     Wellness examination     Type 2 diabetes mellitus without complication, without long-term current use of insulin (H)     Morbid obesity (H)       Current Outpatient Medications   Medication Sig Dispense Refill     Aspirin 81 MG TBDP Take 1 tablet by mouth daily       blood glucose monitoring (NO BRAND SPECIFIED) meter device kit Use to test blood sugar 2 times daily or as directed. 1 kit 0     blood glucose monitoring (NO BRAND SPECIFIED) test strip Use to test blood sugars 2 times daily or as directed 100 strip 3     Ginkgo Biloba (GINKGO PO) Take 1 tablet by mouth daily       Ginseng (ELEUTHERO PO) Take 1 tablet by mouth daily       hydrochlorothiazide (HYDRODIURIL) 12.5 MG tablet Take 1 tablet (12.5 mg) by mouth daily 30 tablet 0     insulin NPH (HUMULIN N/NOVOLIN N VIAL) 100 UNIT/ML vial Inject 12 Units Subcutaneous in the morning and 10 units subcutaneously in the evening 10 mL 3     Lancets Misc. (MULTI-LANCET DEVICE 2) KIT 1 lancet 2 times daily As needed to check blood sugar levels 1 kit 1     lisinopril (ZESTRIL) 40 MG tablet Take 1 tablet (40 mg) by mouth daily 90 tablet 2     metFORMIN (GLUCOPHAGE-XR) 500 MG 24 hr tablet Take 2 tablets (1,000 mg) by mouth 2 times daily (with meals) 360 tablet 4     NEEDLES, ANY SIZE Use needle with syringe twice daily 2 Box 4     propranolol ER (INDERAL LA) 160 MG 24 hr capsule Take 1 capsule (160 mg) by mouth daily 90 capsule 4     zolpidem (AMBIEN) 10 MG tablet Take 1 tablet (10 mg) by mouth nightly as needed for sleep 90 tablet 1       No Known Allergies     Social:   Works for [employer].  Has 4 children.  2 are about to go off to college.      OBJECTIVE    Vitals: BP (!) 165/95   Pulse 84   Temp 97.6  F (36.4  C)   Ht 1.845 m (6' 0.64\")   Wt 122.9 kg (271 lb 0.6 oz)   SpO2 98%   BMI " 36.12 kg/m    BMI= Body mass index is 36.12 kg/m .  Repeat blood pressure as follows:   Vitals:    03/25/22 1059 03/25/22 1207   BP: (!) 165/95 (!) 146/87     He appears well and in no distress.  Cardiovascular-regular rate and rhythm no murmur    Lungs-clear to auscultation    SEE TOP OF NOTE FOR ASSESSMENT AND PLAN    --Rory Ponce MD  [facility], Department of Family Medicine and Community Health

## 2022-03-25 NOTE — PATIENT INSTRUCTIONS
ASSESSMENT/PLAN:    1. Dougie is a 55 yo DM2 (on insulin and glucophage) and htn who has been followed by Dr. Richards for over 20 years   - Will have nurses arrange for needle refills and give an additional 30 tabs of hydrochlorothiazide (12.5mg/day)  - Discussed increasing the hydrochlorothiazide, but deferred as Dougie is at a high body weight.  - He will try to lower body weight. Suggested standardizing morning meals and including green veggies in that.   - *Schedule follow up annual exam with Dr. Richards within the next 1-2 months.       --Rory Ponce MD

## 2022-03-25 NOTE — TELEPHONE ENCOUNTER
Patient was seen in clinic with Dr. Ponce, but his PCP is Dr. Richards. Patient advised by Dr. Ponce to return to clinic for labs which are overdue. Patient is requesting refills of all his medications. He does not use a Rx for insulin, he gets this OTC at [facility].     Dr. Ponce advised patient to follow up with Dr. Richards, patient is now scheduled for an annual exam in August.     DUE FOR LABS NOW - LAST LABS 9/10/21, orders are in place from Dr. Ponce.     BP Readings from Last 3 Encounters:   03/25/22 (!) 146/87   09/10/21 (!) 155/94   03/05/21 (!) 162/97     Component      Latest Ref Rng & Units 9/10/2021   Hemoglobin A1C      0.0 - 5.6 % 7.1 (H)       Routing refill request to provider for review/approval because:  Zolpidem is not on the [facility] refill protocol

## 2022-03-25 NOTE — NURSING NOTE
"56 year old  Chief Complaint   Patient presents with     Hypertension     Diabetes       Blood pressure (!) 165/95, pulse 84, temperature 97.6  F (36.4  C), height 1.845 m (6' 0.64\"), weight 122.9 kg (271 lb 0.6 oz), SpO2 98 %. Body mass index is 36.12 kg/m .  Patient Active Problem List   Diagnosis     Insomnia     Essential hypertension, benign     Wellness examination     Type 2 diabetes mellitus without complication, without long-term current use of insulin (H)     Morbid obesity (H)       Wt Readings from Last 2 Encounters:   03/25/22 122.9 kg (271 lb 0.6 oz)   09/10/21 119.4 kg (263 lb 4 oz)     BP Readings from Last 3 Encounters:   03/25/22 (!) 165/95   09/10/21 (!) 155/94   03/05/21 (!) 162/97         Current Outpatient Medications   Medication     Aspirin 81 MG TBDP     blood glucose monitoring (NO BRAND SPECIFIED) meter device kit     blood glucose monitoring (NO BRAND SPECIFIED) test strip     Ginkgo Biloba (GINKGO PO)     Ginseng (ELEUTHERO PO)     hydrochlorothiazide (HYDRODIURIL) 12.5 MG tablet     insulin NPH (HUMULIN N/NOVOLIN N VIAL) 100 UNIT/ML vial     Lancets Misc. (MULTI-LANCET DEVICE 2) KIT     lisinopril (ZESTRIL) 40 MG tablet     metFORMIN (GLUCOPHAGE-XR) 500 MG 24 hr tablet     NEEDLES, ANY SIZE     propranolol ER (INDERAL LA) 160 MG 24 hr capsule     zolpidem (AMBIEN) 10 MG tablet     No current facility-administered medications for this visit.       Social History     Tobacco Use     Smoking status: Never Smoker     Smokeless tobacco: Never Used   Substance Use Topics     Alcohol use: Yes     Comment: rarely     Drug use: No       Health Maintenance Due   Topic Date Due     ADVANCE CARE PLANNING  Never done     HIV SCREENING  Never done     HEPATITIS C SCREENING  Never done     EYE EXAM  11/05/2016     Pneumococcal Vaccine: Pediatrics (0 to 5 Years) and At-Risk Patients (6 to 64 Years) (1 of 2 - PPSV23) 08/04/2017     PHQ-2 (once per calendar year)  01/01/2022     PREVENTIVE CARE VISIT  " 02/25/2022     LIPID  02/25/2022     MICROALBUMIN  02/25/2022     DIABETIC FOOT EXAM  02/25/2022     A1C  03/10/2022       No results found for: PAP      March 25, 2022 11:01 AM

## 2022-03-28 RX ORDER — ATORVASTATIN CALCIUM 20 MG/1
20 TABLET, FILM COATED ORAL DAILY
Qty: 90 TABLET | Refills: 3 | Status: SHIPPED | OUTPATIENT
Start: 2022-03-28 | End: 2022-08-09

## 2022-04-10 ENCOUNTER — HEALTH MAINTENANCE LETTER (OUTPATIENT)
Age: 57
End: 2022-04-10

## 2022-04-14 DIAGNOSIS — E11.9 TYPE 2 DIABETES MELLITUS WITHOUT COMPLICATION, WITHOUT LONG-TERM CURRENT USE OF INSULIN (H): ICD-10-CM

## 2022-04-15 RX ORDER — METFORMIN HCL 500 MG
1000 TABLET, EXTENDED RELEASE 24 HR ORAL 2 TIMES DAILY WITH MEALS
Qty: 360 TABLET | Refills: 0 | OUTPATIENT
Start: 2022-04-15

## 2022-04-15 RX ORDER — METFORMIN HCL 500 MG
1000 TABLET, EXTENDED RELEASE 24 HR ORAL 2 TIMES DAILY WITH MEALS
Qty: 360 TABLET | Refills: 0 | Status: SHIPPED | OUTPATIENT
Start: 2022-04-15 | End: 2022-07-20

## 2022-04-15 NOTE — TELEPHONE ENCOUNTER
Medication requested: METFORMIN  Medication last refilled: 3/25/22 #360 (3 MONTH SUPPLY)    Duplicate order - 3/25/22 90 day sent to local [facility] pharmacy. Will contact patient to determine if he wants future medications through [facility] or through [facility] mail order.     Maryuri Pryor MS RN-BC  04/15/22  10:54 AM

## 2022-05-16 DIAGNOSIS — I10 ESSENTIAL HYPERTENSION, BENIGN: ICD-10-CM

## 2022-05-16 NOTE — TELEPHONE ENCOUNTER
Last time prescribed: 3/25/22 , 90 tabs x 0 refills  Last office visit: 3/25/22  Next appointment: 8/9/22    Medication is being filled for 1 time refill only due to:  Patient needs to be seen because needs BP check.   Aishwarya Pollack RN  [facility]

## 2022-05-17 RX ORDER — HYDROCHLOROTHIAZIDE 12.5 MG/1
12.5 TABLET ORAL DAILY
Qty: 90 TABLET | Refills: 0 | Status: SHIPPED | OUTPATIENT
Start: 2022-05-17 | End: 2022-08-09

## 2022-05-26 DIAGNOSIS — I10 ESSENTIAL HYPERTENSION, BENIGN: ICD-10-CM

## 2022-05-26 NOTE — TELEPHONE ENCOUNTER
Last office visit was on 03/25/2022, next visit is scheduled for 08/09/2022 with Dr. Richards.  Medication last refilled on 03/25/2022 with 90 capsules and 0 refills.    Medication is being filled for 1 time refill only due to:  Patient needs to be seen because BP check..pt has upcoming appt in Aug with provider.  Aishwarya Pollack RN  [facility]

## 2022-05-27 RX ORDER — PROPRANOLOL HYDROCHLORIDE 160 MG/1
160 CAPSULE, EXTENDED RELEASE ORAL DAILY
Qty: 90 CAPSULE | Refills: 0 | Status: SHIPPED | OUTPATIENT
Start: 2022-05-27 | End: 2022-08-09

## 2022-07-19 DIAGNOSIS — E11.9 TYPE 2 DIABETES MELLITUS WITHOUT COMPLICATION, WITHOUT LONG-TERM CURRENT USE OF INSULIN (H): ICD-10-CM

## 2022-07-19 NOTE — TELEPHONE ENCOUNTER
Last time prescribed: 4/15/22 , 360 tabs/caps x 0 refills  Last office visit: 3/25/22  Next appointment: 8/9/22

## 2022-07-20 RX ORDER — METFORMIN HCL 500 MG
1000 TABLET, EXTENDED RELEASE 24 HR ORAL 2 TIMES DAILY WITH MEALS
Qty: 360 TABLET | Refills: 0 | Status: SHIPPED | OUTPATIENT
Start: 2022-07-20 | End: 2022-08-09

## 2022-07-20 NOTE — TELEPHONE ENCOUNTER
Medication requested: metFORMIN (GLUCOPHAGE-XR) 500 MG 24 hr tablet  Last office visit: 3/25/2022  [facility] appointments: 8/9/2022  Medication last refilled: 4/15/2022; 360 + 0 refills  Last qualifying labs:     Component      Latest Ref Rng & Units 3/25/2022   Hemoglobin A1C      0.0 - 5.6 % 7.3 (H)     Component      Latest Ref Rng & Units 3/25/2022   Creatinine      0.66 - 1.25 mg/dL 0.85     Prescription approved per [facility] Refill Protocol.    GUALBERTO Jones, RN  07/20/22, 2:43 PM

## 2022-08-07 ASSESSMENT — ENCOUNTER SYMPTOMS
CHILLS: 0
HEADACHES: 0
PALPITATIONS: 0
HEMATURIA: 0
DYSURIA: 0
DIZZINESS: 0
ARTHRALGIAS: 0
FEVER: 0
WEAKNESS: 0
SORE THROAT: 0
HEARTBURN: 0
NERVOUS/ANXIOUS: 0
SHORTNESS OF BREATH: 0
DIARRHEA: 0
PARESTHESIAS: 0
ABDOMINAL PAIN: 0
JOINT SWELLING: 0
CONSTIPATION: 0
MYALGIAS: 0
FREQUENCY: 0
EYE PAIN: 0
COUGH: 0
NAUSEA: 0
HEMATOCHEZIA: 0

## 2022-08-09 ENCOUNTER — OFFICE VISIT (OUTPATIENT)
Dept: FAMILY MEDICINE | Facility: CLINIC | Age: 57
End: 2022-08-09
Payer: COMMERCIAL

## 2022-08-09 VITALS
DIASTOLIC BLOOD PRESSURE: 81 MMHG | WEIGHT: 265 LBS | HEART RATE: 75 BPM | SYSTOLIC BLOOD PRESSURE: 143 MMHG | TEMPERATURE: 98.1 F | OXYGEN SATURATION: 98 % | HEIGHT: 72 IN | BODY MASS INDEX: 35.89 KG/M2

## 2022-08-09 DIAGNOSIS — Z23 NEED FOR VACCINATION: ICD-10-CM

## 2022-08-09 DIAGNOSIS — Z13.220 SCREENING FOR LIPID DISORDERS: ICD-10-CM

## 2022-08-09 DIAGNOSIS — Z91.89 AT RISK FOR CARDIOVASCULAR EVENT: ICD-10-CM

## 2022-08-09 DIAGNOSIS — Z00.00 WELLNESS EXAMINATION: Primary | ICD-10-CM

## 2022-08-09 DIAGNOSIS — I10 ESSENTIAL HYPERTENSION, BENIGN: ICD-10-CM

## 2022-08-09 DIAGNOSIS — Z12.5 SCREENING FOR PROSTATE CANCER: ICD-10-CM

## 2022-08-09 DIAGNOSIS — E11.9 TYPE 2 DIABETES MELLITUS WITHOUT COMPLICATION, WITHOUT LONG-TERM CURRENT USE OF INSULIN (H): ICD-10-CM

## 2022-08-09 LAB
ALT SERPL W P-5'-P-CCNC: 31 U/L (ref 10–50)
ANION GAP SERPL CALCULATED.3IONS-SCNC: 10 MMOL/L (ref 7–15)
BUN SERPL-MCNC: 15.4 MG/DL (ref 6–20)
CALCIUM SERPL-MCNC: 9.8 MG/DL (ref 8.6–10)
CHLORIDE SERPL-SCNC: 103 MMOL/L (ref 98–107)
CHOLEST SERPL-MCNC: 161 MG/DL
CREAT SERPL-MCNC: 0.76 MG/DL (ref 0.67–1.17)
CREAT UR-MCNC: 126 MG/DL
DEPRECATED HCO3 PLAS-SCNC: 29 MMOL/L (ref 22–29)
GFR SERPL CREATININE-BSD FRML MDRD: >90 ML/MIN/1.73M2
GLUCOSE SERPL-MCNC: 176 MG/DL (ref 70–99)
HBA1C MFR BLD: 8.2 % (ref 0–5.6)
HDLC SERPL-MCNC: 47 MG/DL
LDLC SERPL CALC-MCNC: 93 MG/DL
MICROALBUMIN UR-MCNC: 28.3 MG/L
MICROALBUMIN/CREAT UR: 22.46 MG/G CR (ref 0–17)
NONHDLC SERPL-MCNC: 114 MG/DL
POTASSIUM SERPL-SCNC: 4.3 MMOL/L (ref 3.4–5.3)
PSA SERPL-MCNC: 1.64 NG/ML (ref 0–3.5)
SODIUM SERPL-SCNC: 142 MMOL/L (ref 136–145)
TRIGL SERPL-MCNC: 103 MG/DL

## 2022-08-09 PROCEDURE — 80048 BASIC METABOLIC PNL TOTAL CA: CPT | Performed by: FAMILY MEDICINE

## 2022-08-09 PROCEDURE — 86803 HEPATITIS C AB TEST: CPT | Performed by: FAMILY MEDICINE

## 2022-08-09 PROCEDURE — 82043 UR ALBUMIN QUANTITATIVE: CPT | Performed by: FAMILY MEDICINE

## 2022-08-09 PROCEDURE — 80061 LIPID PANEL: CPT | Performed by: FAMILY MEDICINE

## 2022-08-09 PROCEDURE — G0103 PSA SCREENING: HCPCS | Performed by: FAMILY MEDICINE

## 2022-08-09 PROCEDURE — 84460 ALANINE AMINO (ALT) (SGPT): CPT | Performed by: FAMILY MEDICINE

## 2022-08-09 RX ORDER — PROPRANOLOL HYDROCHLORIDE 160 MG/1
160 CAPSULE, EXTENDED RELEASE ORAL DAILY
Qty: 90 CAPSULE | Refills: 4 | Status: SHIPPED | OUTPATIENT
Start: 2022-08-09 | End: 2023-11-07

## 2022-08-09 RX ORDER — HYDROCHLOROTHIAZIDE 12.5 MG/1
12.5 TABLET ORAL DAILY
Qty: 90 TABLET | Refills: 4 | Status: SHIPPED | OUTPATIENT
Start: 2022-08-09 | End: 2023-08-25

## 2022-08-09 RX ORDER — ATORVASTATIN CALCIUM 20 MG/1
20 TABLET, FILM COATED ORAL DAILY
Qty: 90 TABLET | Refills: 4 | Status: SHIPPED | OUTPATIENT
Start: 2022-08-09 | End: 2023-08-09

## 2022-08-09 RX ORDER — METFORMIN HCL 500 MG
1000 TABLET, EXTENDED RELEASE 24 HR ORAL 2 TIMES DAILY WITH MEALS
Qty: 360 TABLET | Refills: 4 | Status: SHIPPED | OUTPATIENT
Start: 2022-08-09 | End: 2023-09-05

## 2022-08-09 RX ORDER — LISINOPRIL 40 MG/1
40 TABLET ORAL DAILY
Qty: 90 TABLET | Refills: 4 | Status: SHIPPED | OUTPATIENT
Start: 2022-08-09 | End: 2023-11-02

## 2022-08-09 NOTE — NURSING NOTE
Prior to immunization administration, verified patients identity using patient s name and date of birth. Please see Immunization Activity for additional information.     Screening Questionnaire for Adult Immunization    Are you sick today?   No   Do you have allergies to medications, food, a vaccine component or latex?   No   Have you ever had a serious reaction after receiving a vaccination?   No   Do you have a long-term health problem with heart, lung, kidney, or metabolic disease (e.g., diabetes), asthma, a blood disorder, no spleen, complement component deficiency, a cochlear implant, or a spinal fluid leak?  Are you on long-term aspirin therapy?   No   Do you have cancer, leukemia, HIV/AIDS, or any other immune system problem?   No   Do you have a parent, brother, or sister with an immune system problem?   No   In the past 3 months, have you taken medications that affect  your immune system, such as prednisone, other steroids, or anticancer drugs; drugs for the treatment of rheumatoid arthritis, Crohn s disease, or psoriasis; or have you had radiation treatments?   No   Have you had a seizure, or a brain or other nervous system problem?   No   During the past year, have you received a transfusion of blood or blood    products, or been given immune (gamma) globulin or antiviral drug?   No   For women: Are you pregnant or is there a chance you could become       pregnant during the next month?   No   Have you received any vaccinations in the past 4 weeks?   No     Immunization questionnaire answers were all negative.        Per orders of Dr. Richards, injection of PCV 20 given by Afia Mason CMA. Patient instructed to remain in clinic for 15 minutes afterwards, and to report any adverse reaction to me immediately.       Screening performed by Afia Mason CMA on 8/9/2022 at 2:22 PM.

## 2022-08-09 NOTE — PROGRESS NOTES
"CHIEF COMPLAINT: Annual Wellness Exam      HISTORY OF PRESENT ILLNESS:  Dougie Nino is a 57 year old male who presents for an annual wellness visit.  Overall, he is doing well. He wears glasses and eye care is not up to date. His dental care is up to date. No hearing concerns. His BP is 147/88 today, he has not taken his BP medications yet and notes that his stress level has been elevated within the past 24 hours. Body mass index is 35.89 kg/m . From an immunization standpoint, he is due for his pneumococcal vaccine and the second COVID booster. Tdap due 2027. He notes that his vaccine records have been mixed up with a different Dougie Nino, so his COVID vaccine record is incorrect on [facility].     Negative Cologuard completed 3/3/21, repeat due 2024. He is due for prostate cancer screening.     Dougie has T2DM without complication. He is on insulin.  His last A1c was  7.3 on 3/25/22. Currently taking metformin 500 mg x 2 tablets BID. He is currently taking lisinopril 40 mg daily, propanolol  mg daily and hydrochlorothiazide 12.5 mg daily for HTN. Today, he mentions that he doesn't take many at-home BP readings, but most of his readings are around the 140s.Taking atrovastatin 20 mg daily for hyperlipidemia. He is a nonsmoker and takes Asprin 81 mg daily.     He is concerned about skin lesions on his chest and arms which he would like to be examined today.     Dougie mentions a persistent dry cough. This started after a cold about one month ago and has not yet resolved, but it has improved with time.      FAMILY, SOCIAL AND PAST SURGICAL HISTORIES: Updated      MEDICATIONS:   Carefully Reviewed      REVIEW OF SYSTEMS:  10-point review of systems negative unless otherwise stated in the HPI.       OBJECTIVE:    EXAM:    GENERAL: Dougie is in no distress.  Affect is upbeat.   Alert and oriented x3  BP (!) 147/88   Pulse 73   Temp 98.1  F (36.7  C)   Ht 1.83 m (6' 0.05\")   Wt 120.2 kg (265 lb)   SpO2 98%   BMI " 35.89 kg/m    HEENT:  Head is normocephalic, atraumatic. Ears clear bilaterally. No pain with palpation of the frontal or maxillary sinuses.  Eyes are grossly normal.  Nose and mouth masked.  Neck is supple without adenopathy or thyromegaly.  No carotid bruits are heard, no JVD.   BACK:  Smooth and straight.  No pain to percussion.  No CVA tenderness.   LUNGS:  Clear to auscultation bilaterally.   HEART:  Regular rate and rhythm without murmur.   EXTREMITIES:  Ankles free of any edema.   SKIN:  Warm and dry. Seborrhea keratoses on his chest and R arm.  Pimple inside his R nostril.      LABORATORY DATA: No results found for any visits on 08/09/22.      ASSESSMENT AND PLAN:   1. Annual Wellness Exam: Up to date with healthcare maintenance strategies.  Type 2 diabetes mellitus without complication, without long-term current use of insulin: Albumin Random Urine Quantitative with Creat Ratio, [place] foot exam no charge, Hemoglobin A1c, Basic metabolic panel, Lipid Profile, and ALT completed today results pending. Dougie is fasting today. metformin 500 mg 24 hr tablet refilled. Return to clinic in 6 months for diabetic f/u.  2. Need for vaccination: Pneumococcal 20 Valent Conjugate (Prevnar 20) given.  3. Need for hepatitis C screening test: Hepatitis C Screen Reflex to HCV RNA Quant and Genotype completed today, results pending.  4. Screening for prostate cancer: Prostate Specific Antigen Screen completed today, result spending.  5. Essential hypertension, benign: propranolol  mg 24 hr capsule, lisinopril 40 mg tablet, kxfnmudkcsfestbfaac26.5 mg tablet refilled.  6. At risk for cardiovascular event: atorvastatin 20 mg tablet refilled  7.  Follow-up in one year or call if there are any questions or concerns.    IMadeleine, am serving as a scribe to document services personally performed by Dr. Live Richards, based on data collection and the provider's statements to me. Dr. Richards has reviewed, edited, and approved  the above note.     I, Dr. Live Richards, have interviewed and examined this patient, and I have thoroughly reviewed and edited this note and agree with its contents.      Answers for HPI/ROS submitted by the patient on 8/7/2022  Frequency of exercise:: 6-7 days/week  Getting at least 3 servings of Calcium per day:: Yes  Diet:: Regular (no restrictions)  Taking medications regularly:: Yes  Medication side effects:: None  Bi-annual eye exam:: NO  Dental care twice a year:: Yes  Sleep apnea or symptoms of sleep apnea:: Daytime drowsiness, Excessive snoring  abdominal pain: No  Blood in stool: No  Blood in urine: No  chest pain: No  chills: No  congestion: No  constipation: No  cough: No  diarrhea: No  dizziness: No  ear pain: No  eye pain: No  nervous/anxious: No  fever: No  frequency: No  genital sores: No  headaches: No  hearing loss: No  heartburn: No  arthralgias: No  joint swelling: No  peripheral edema: No  mood changes: No  myalgias: No  nausea: No  dysuria: No  palpitations: No  Skin sensation changes: No  sore throat: No  urgency: No  rash: No  shortness of breath: No  visual disturbance: No  weakness: No  impotence: No  penile discharge: No  Additional concerns today:: No  Duration of exercise:: 45-60 minutes

## 2022-08-10 LAB — HCV AB SERPL QL IA: NONREACTIVE

## 2022-08-19 ENCOUNTER — OFFICE VISIT (OUTPATIENT)
Dept: OPHTHALMOLOGY | Facility: CLINIC | Age: 57
End: 2022-08-19
Attending: OPTOMETRIST
Payer: COMMERCIAL

## 2022-08-19 DIAGNOSIS — H52.13 MYOPIA OF BOTH EYES WITH ASTIGMATISM AND PRESBYOPIA: Primary | ICD-10-CM

## 2022-08-19 DIAGNOSIS — H52.4 MYOPIA OF BOTH EYES WITH ASTIGMATISM AND PRESBYOPIA: Primary | ICD-10-CM

## 2022-08-19 DIAGNOSIS — H52.203 MYOPIA OF BOTH EYES WITH ASTIGMATISM AND PRESBYOPIA: Primary | ICD-10-CM

## 2022-08-19 PROCEDURE — G0463 HOSPITAL OUTPT CLINIC VISIT: HCPCS

## 2022-08-19 PROCEDURE — 92004 COMPRE OPH EXAM NEW PT 1/>: CPT | Performed by: OPTOMETRIST

## 2022-08-19 ASSESSMENT — EXTERNAL EXAM - LEFT EYE: OS_EXAM: NORMAL

## 2022-08-19 ASSESSMENT — CONF VISUAL FIELD
METHOD: COUNTING FINGERS
OD_NORMAL: 1
OS_NORMAL: 1

## 2022-08-19 ASSESSMENT — VISUAL ACUITY
OD_CC+: -1
OD_CC: 20/25
OS_CC: 20/25
METHOD: SNELLEN - LINEAR
OS_CC+: -1

## 2022-08-19 ASSESSMENT — CUP TO DISC RATIO
OD_RATIO: 0.15
OS_RATIO: 0.15

## 2022-08-19 ASSESSMENT — REFRACTION_MANIFEST
OD_AXIS: 157
OS_ADD: +1.75
OS_AXIS: 165
OS_CYLINDER: +0.50
OS_SPHERE: PLANO
OD_ADD: +1.75
OD_CYLINDER: +0.25
OD_SPHERE: +0.50

## 2022-08-19 ASSESSMENT — EXTERNAL EXAM - RIGHT EYE: OD_EXAM: NORMAL

## 2022-08-19 ASSESSMENT — SLIT LAMP EXAM - LIDS
COMMENTS: MILD DERMATOCHALASIS
COMMENTS: MILD DERMATOCHALASIS

## 2022-08-19 ASSESSMENT — TONOMETRY
IOP_METHOD: ICARE
OS_IOP_MMHG: 11
OD_IOP_MMHG: 13

## 2022-08-19 NOTE — NURSING NOTE
Chief Complaints and History of Present Illnesses   Patient presents with     COMPREHENSIVE EYE EXAM     New Pt here for annual DM CEE.  Last seen 2015.  Type 2 diabetes mellitus without complication      Chief Complaint(s) and History of Present Illness(es)     COMPREHENSIVE EYE EXAM     Laterality: both eyes    Onset: gradual    Onset: years ago    Course: stable    Associated symptoms: redness.  Negative for glare, haloes, dryness, eye pain, tearing, photophobia, flashes, floaters, itching, discharge and burning    Treatments tried: no treatments    Pain scale: 0/10    Comments: New Pt here for annual DM CEE.  Last seen 2015.  Type 2 diabetes mellitus without complication               Comments     Pt states vision is stable since last visit, only uses readers.  No ocular medications.  Some redness in LE starting yesterday.    DM 2  Lab Results       Component                Value               Date                       A1C                      8.2                 08/09/2022                 A1C                      7.3                 03/25/2022                 A1C                      7.1                 09/10/2021                 A1C                      6.5                 02/25/2021                 A1C                      6.9                 10/23/2019                 A1C                      6.3                 09/07/2018                 A1C                      14.0                01/23/2018                 A1C                      7.3                 06/09/2017            Pt did not check BS this am.    ALEXANDRU Lemons August 19, 2022 8:40 AM

## 2022-08-19 NOTE — PROGRESS NOTES
Assessment & Plan       Dougie Nino is a 57 year old male with the following diagnoses:   1. Myopia of both eyes with astigmatism and presbyopia - Both Eyes        readers   Routine exam 2 years     Patient disposition:   No follow-ups on file.          Complete documentation of historical and exam elements from today's encounter can be found in the full encounter summary report (not reduplicated in this progress note). I personally obtained the chief complaint(s) and history of present illness.  I confirmed and edited as necessary the review of systems, past medical/surgical history, family history, social history, and examination findings as documented by others; and I examined the patient myself. I personally reviewed the relevant tests, images, and reports as documented above. I formulated and edited as necessary the assessment and plan and discussed the findings and management plan with the patient and family.  Dr. Ha Short

## 2022-10-05 ENCOUNTER — NURSE TRIAGE (OUTPATIENT)
Dept: FAMILY MEDICINE | Facility: CLINIC | Age: 57
End: 2022-10-05

## 2022-10-05 DIAGNOSIS — K57.32 DIVERTICULITIS OF COLON: Primary | ICD-10-CM

## 2022-10-05 RX ORDER — CIPROFLOXACIN 500 MG/1
500 TABLET, FILM COATED ORAL 2 TIMES DAILY
Qty: 14 TABLET | Refills: 0 | Status: SHIPPED | OUTPATIENT
Start: 2022-10-05 | End: 2022-10-31

## 2022-10-05 NOTE — TELEPHONE ENCOUNTER
Who is calling? Patient  Medication name: antibiotic  Is this a refill request? No  Have they contacted the pharmacy?  No  Pharmacy:   [facility] [id number] [facility] [employer] - [place] - [address]  [address] [address]  Phone: [phone] Fax: [phone]    Question/Concern: Pt states he believes he is having a diverticulitis attack and would like Maurice to send in an antibiotic  Would patient like a call back? Yes     Leonora

## 2022-10-05 NOTE — TELEPHONE ENCOUNTER
Dr. Richards asked that I send a prescription for Cipro 500 mg, 1 tablet twice daily for 7 days, #14.  If symptoms completely resolved by the end of treatment course, no further action.  If improvement, but not completely resolved, Dr. Richards would like to see Dougie in clinic.  Dougie is in agreement with this plan.  GUALBERTO Mae, RN, [facility]  10/05/22  4:57 PM

## 2022-10-05 NOTE — CONFIDENTIAL NOTE
1. LOCATION: Mid-abdomen  2. RADIATION: None  3. ONSET: 10/5/22  4. SUDDEN: Gradual  5. PATTERN:  Constant  6. SEVERITY:  5/10  7. RECURRENT SYMPTOM: Yes, has known diverticulosis with history of diverticulitis  8. CAUSE: unknown  9. RELIEVING/AGGRAVATING FACTORS: Lying down does ease it some.  10. OTHER SYMPTOMS: Have bowel movements, but they are small amounts and very skinny.    Patient instructed to start clear liquids for a few days and stay well hydrated.  I will check with Dr. Richards to see if he is willing to start Dougie on antibiotics right away or if he wants to see him in clinic and follow up with Dougie once I have an answer.  NAGA MaeN, RN, [facility]  10/05/22  2:54 PM

## 2022-10-05 NOTE — CONFIDENTIAL NOTE
Voicemail left for Dougie to return call to clinic to visit about his abdominal pain.  NAGA MaeN, RN, [facility]  10/05/22  2:29 PM

## 2022-10-11 ENCOUNTER — OFFICE VISIT (OUTPATIENT)
Dept: FAMILY MEDICINE | Facility: CLINIC | Age: 57
End: 2022-10-11
Payer: COMMERCIAL

## 2022-10-11 VITALS — BODY MASS INDEX: 35.79 KG/M2 | WEIGHT: 264.25 LBS | TEMPERATURE: 97.4 F

## 2022-10-11 DIAGNOSIS — K57.32 DIVERTICULITIS OF COLON: ICD-10-CM

## 2022-10-11 DIAGNOSIS — R10.84 ABDOMINAL PAIN, GENERALIZED: Primary | ICD-10-CM

## 2022-10-11 RX ORDER — METRONIDAZOLE 500 MG/1
500 TABLET ORAL 2 TIMES DAILY
Qty: 14 TABLET | Refills: 0 | Status: SHIPPED | OUTPATIENT
Start: 2022-10-11 | End: 2022-10-18

## 2022-10-11 ASSESSMENT — PAIN SCALES - GENERAL: PAINLEVEL: MILD PAIN (3)

## 2022-10-11 NOTE — PROGRESS NOTES
Assessment & Plan   Problem List Items Addressed This Visit        Digestive    Diverticulitis of colon    Relevant Medications    metroNIDAZOLE (FLAGYL) 500 MG tablet   Other Visit Diagnoses     Abdominal pain, generalized    -  Primary    Relevant Medications    metroNIDAZOLE (FLAGYL) 500 MG tablet        Unclear at this point if symptoms are due to diverticular flare. Ciprofloxacin appears to have largely resolved symptoms, but late recurrence of mild flares is concerning for persistent infection. Will proceed with one week of flagyl for anaerobic coverage. Advised patient if symptoms fail to improve over the next few days I would recommend CT imaging for more definitive assessment.      Diagnoses and all orders for this visit:    Abdominal pain, generalized  -     metroNIDAZOLE (FLAGYL) 500 MG tablet; Take 1 tablet (500 mg) by mouth 2 times daily for 7 days    Diverticulitis of colon    26 minutes spent on the date of the encounter doing chart review, history and exam, documentation and further activities as noted.      Brian Calixto MD  [facility]    Cal Constantino is a 57 year old presenting for the following health issues:  No chief complaint on file.      HPI   Abdominal Pain  - per patient, he does have a history of diverticulitis, happens maybe once a year, this was not previously noted in his problem list  - most recently, started on a week of ciprofloxacin on 10/5 for suspected diverticulitis flare  - advised at that time if symptoms persisted beyond treatment course he should be seen in clinic  - mostly, symptoms were much improved over the week, this morning however his pain level went back up to 3/10  - describes symptoms as more of a general fullness throughout his abdomen with more focalized pain right at the umbilicus  - denies constipation, diarrhea, blood in stool  - no fever, chills, nausea      Review of Systems   Constitutional, HEENT, cardiovascular, pulmonary, gi and gu systems  are negative, except as otherwise noted.      Objective    Temp 97.4  F (36.3  C) (Skin)   Wt 119.9 kg (264 lb 4 oz)   BMI 35.79 kg/m    Body mass index is 35.79 kg/m .  Physical Exam   GENERAL: healthy, alert and no distress  NECK: no adenopathy, no asymmetry, masses, or scars and thyroid normal to palpation  RESP: lungs clear to auscultation - no rales, rhonchi or wheezes  CV: regular rate and rhythm, normal S1 S2, no S3 or S4, no murmur, click or rub, no peripheral edema and peripheral pulses strong  ABDOMEN: soft, mild tenderness most notably at umbilicus, no hepatosplenomegaly, no masses and bowel sounds normal  MS: no gross musculoskeletal defects noted, no edema

## 2022-10-11 NOTE — NURSING NOTE
"    57 year old  No chief complaint on file.      Temperature 97.4  F (36.3  C), temperature source Skin, weight 119.9 kg (264 lb 4 oz). Body mass index is 35.79 kg/m .  Patient Active Problem List   Diagnosis     Insomnia     Essential hypertension, benign     Wellness examination     Type 2 diabetes mellitus without complication, without long-term current use of insulin (H)     Morbid obesity (H)       Wt Readings from Last 2 Encounters:   10/11/22 119.9 kg (264 lb 4 oz)   08/09/22 120.2 kg (265 lb)     BP Readings from Last 3 Encounters:   08/09/22 (!) 143/81   03/25/22 (!) 146/87   09/10/21 (!) 155/94         Current Outpatient Medications   Medication     Aspirin 81 MG TBDP     atorvastatin (LIPITOR) 20 MG tablet     blood glucose monitoring (NO BRAND SPECIFIED) meter device kit     blood glucose monitoring (NO BRAND SPECIFIED) test strip     ciprofloxacin (CIPRO) 500 MG tablet     Ginkgo Biloba (GINKGO PO)     Ginseng (ELEUTHERO PO)     hydrochlorothiazide (HYDRODIURIL) 12.5 MG tablet     insulin  UNIT/ML vial     insulin syringe-needle U-100 (30G X 1/2\" 1 ML) 30G X 1/2\" 1 ML miscellaneous     Lancets Misc. (MULTI-LANCET DEVICE 2) KIT     lisinopril (ZESTRIL) 40 MG tablet     metFORMIN (GLUCOPHAGE XR) 500 MG 24 hr tablet     propranolol ER (INDERAL LA) 160 MG 24 hr capsule     zolpidem (AMBIEN) 10 MG tablet     No current facility-administered medications for this visit.       Social History     Tobacco Use     Smoking status: Never     Smokeless tobacco: Never   Substance Use Topics     Alcohol use: Yes     Comment: rarely     Drug use: No       Health Maintenance Due   Topic Date Due     ADVANCE CARE PLANNING  Never done     HIV SCREENING  Never done     COVID-19 Vaccine (5 - Booster) 03/02/2022     INFLUENZA VACCINE (1) 09/01/2022       No results found for: PAP      October 11, 2022 4:46 PM    "

## 2022-10-15 ENCOUNTER — HEALTH MAINTENANCE LETTER (OUTPATIENT)
Age: 57
End: 2022-10-15

## 2022-10-31 ENCOUNTER — OFFICE VISIT (OUTPATIENT)
Dept: FAMILY MEDICINE | Facility: CLINIC | Age: 57
End: 2022-10-31
Payer: COMMERCIAL

## 2022-10-31 VITALS
SYSTOLIC BLOOD PRESSURE: 149 MMHG | WEIGHT: 270 LBS | OXYGEN SATURATION: 98 % | BODY MASS INDEX: 36.57 KG/M2 | TEMPERATURE: 97.2 F | RESPIRATION RATE: 15 BRPM | DIASTOLIC BLOOD PRESSURE: 88 MMHG | HEART RATE: 80 BPM

## 2022-10-31 DIAGNOSIS — E11.9 TYPE 2 DIABETES MELLITUS WITHOUT COMPLICATION, WITHOUT LONG-TERM CURRENT USE OF INSULIN (H): ICD-10-CM

## 2022-10-31 DIAGNOSIS — R10.32 ABDOMINAL PAIN, LEFT LOWER QUADRANT: ICD-10-CM

## 2022-10-31 DIAGNOSIS — K57.32 DIVERTICULITIS OF COLON: Primary | ICD-10-CM

## 2022-10-31 DIAGNOSIS — Z23 NEED FOR INFLUENZA VACCINATION: ICD-10-CM

## 2022-10-31 LAB — HBA1C MFR BLD: 7.8 % (ref 0–5.6)

## 2022-10-31 RX ORDER — METRONIDAZOLE 500 MG/1
500 TABLET ORAL 2 TIMES DAILY
Qty: 20 TABLET | Refills: 1 | Status: SHIPPED | OUTPATIENT
Start: 2022-10-31 | End: 2024-09-22

## 2022-10-31 NOTE — NURSING NOTE
"57 year old  Chief Complaint   Patient presents with     Follow Up     For diverticulitis, get a plan in place        Blood pressure (!) 152/89, pulse 80, temperature 97.2  F (36.2  C), temperature source Skin, resp. rate 15, weight 122.5 kg (270 lb), SpO2 98 %. Body mass index is 36.57 kg/m .  Patient Active Problem List   Diagnosis     Insomnia     Essential hypertension, benign     Wellness examination     Type 2 diabetes mellitus without complication, without long-term current use of insulin (H)     Morbid obesity (H)     Diverticulitis of colon       Wt Readings from Last 2 Encounters:   10/31/22 122.5 kg (270 lb)   10/11/22 119.9 kg (264 lb 4 oz)     BP Readings from Last 3 Encounters:   10/31/22 (!) 152/89   08/09/22 (!) 143/81   03/25/22 (!) 146/87         Current Outpatient Medications   Medication     Aspirin 81 MG TBDP     atorvastatin (LIPITOR) 20 MG tablet     blood glucose monitoring (NO BRAND SPECIFIED) meter device kit     blood glucose monitoring (NO BRAND SPECIFIED) test strip     ciprofloxacin (CIPRO) 500 MG tablet     Ginkgo Biloba (GINKGO PO)     Ginseng (ELEUTHERO PO)     hydrochlorothiazide (HYDRODIURIL) 12.5 MG tablet     insulin  UNIT/ML vial     insulin syringe-needle U-100 (30G X 1/2\" 1 ML) 30G X 1/2\" 1 ML miscellaneous     Lancets Misc. (MULTI-LANCET DEVICE 2) KIT     lisinopril (ZESTRIL) 40 MG tablet     metFORMIN (GLUCOPHAGE XR) 500 MG 24 hr tablet     propranolol ER (INDERAL LA) 160 MG 24 hr capsule     zolpidem (AMBIEN) 10 MG tablet     No current facility-administered medications for this visit.       Social History     Tobacco Use     Smoking status: Never     Smokeless tobacco: Never   Substance Use Topics     Alcohol use: Yes     Comment: rarely     Drug use: No       Health Maintenance Due   Topic Date Due     ADVANCE CARE PLANNING  Never done     HIV SCREENING  Never done     COVID-19 Vaccine (5 - Booster) 03/02/2022     INFLUENZA VACCINE (1) 09/01/2022       No results " found for: PAP      October 31, 2022 9:13 AM

## 2022-10-31 NOTE — NURSING NOTE
"Injectable Influenza Immunization Documentation    1.  Has the patient received the information for the injectable influenza vaccine? YES     2. Is the patient 6 months of age or older? YES     3. Does the patient have any of the following contraindications?         Severe allergy to eggs? No     Severe allergic reaction to previous influenza vaccines? No   Severe allergy to latex? No       History of Guillain-Liberal syndrome? No     Currently have a temperature greater than 100.4F? No        4.  Severely egg allergic patients should have flu vaccine eligibility assessed by an MD, RN, or pharmacist, and those who received flu vaccine should be observed for 15 min by an MD, RN, Pharmacist, Medical Technician, or member of clinic staff.\": YES    5. Latex-allergic patients should be given latex-free influenza vaccine Yes. Please reference the Vaccine latex table to determine if your clinic s product is latex-containing.       Vaccination given by Weston Beard, EMT          "

## 2022-10-31 NOTE — PROGRESS NOTES
CHIEF COMPLAINT: Abdominal Pain Follow Up      HISTORY OF PRESENT ILLNESS:  Dougie Nino is a 57 year old male  with a history of diverticulitis who presents for the above.    Dougie began to feel a familiar pain associated with his bouts of diverticulitis on 10/2/2022 and took some antibiotics that he had on hand but was not consistent with them for a full course. He contacted the clinic on 10/5/2022 to report mid-abdominal pain rated at 5/10 and similar to previous bouts of diverticulitis. He was started on a week of ciprofloxacin for a suspected diverticulitis flare and his symptoms were much improved over the week, but his pain level went back up to 3/10 on 10/11/2022. He visited the clinic on 10/11/2022 and described his symptoms as a general fullness throughout his abdomen with more focalized pain right at the umbilicus. He was given metronidazole 500 mg BID x7 days and advised to pursue CT imaging if this did not improve his symptoms.    Today, he reports that the metronidazole resolved his symptoms, but he is concerned that this round of diverticulitis lasted so long. He also notes that the pain was incapacitating to the point where he remained lying down on the couch for much of the time.      MEDICATIONS:   Carefully Reviewed      REVIEW OF SYSTEMS:  10-point review of systems negative unless otherwise stated in the HPI.       OBJECTIVE:    EXAM:  GENERAL: He is in no distress.  Affect is upbeat.   Alert and oriented x3  BP (!) 152/89 (BP Location: Right arm, Patient Position: Sitting, Cuff Size: Adult Large)   Pulse 80   Temp 97.2  F (36.2  C) (Skin)   Resp 15   Wt 122.5 kg (270 lb)   SpO2 98%   BMI 36.57 kg/m        LABORATORY DATA: N/A      ASSESSMENT AND PLAN:   1. Diverticulitis of colon with abdominal pain, left lower quadrant: amoxicillin-clavulanate (AUGMENTIN) 875-125 MG                                          tablet, metroNIDAZOLE (FLAGYL) 500 MG tablet                                            CT Abdomen pelvis w contrast, Adult GI                                           Referral - Procedure Only                                          Get colonoscopy to confirm diverticulitis. Take amoxicillin-clavulanate 875-125 mg BID and metronidazole BID for 10 days beginning with first symptoms of next round of diverticulitis. Get a CT during or just after the next round of diverticulitis.  2.           Need for influenza vaccination: Administered today.  3.           Type 2 diabetes mellitus without complication, without long-term current use of insulin (H): Hemoglobin A1c ordered.      I, Maryuri Mallory, am serving as a scribe to document services personally performed by Dr. Live Richards, based on data collection and the provider's statements to me. Dr. Richards has reviewed, edited, and approved the above note.     I, Dr. Live Richards, have interviewed and examined this patient, and I have thoroughly reviewed and edited this note and agree with its contents.

## 2022-12-14 ENCOUNTER — TRANSFERRED RECORDS (OUTPATIENT)
Dept: HEALTH INFORMATION MANAGEMENT | Facility: CLINIC | Age: 57
End: 2022-12-14

## 2022-12-14 LAB — COLONOSCOPY: NORMAL

## 2023-06-01 ENCOUNTER — HEALTH MAINTENANCE LETTER (OUTPATIENT)
Age: 58
End: 2023-06-01

## 2023-08-09 DIAGNOSIS — Z91.89 AT RISK FOR CARDIOVASCULAR EVENT: ICD-10-CM

## 2023-08-09 RX ORDER — ATORVASTATIN CALCIUM 20 MG/1
20 TABLET, FILM COATED ORAL DAILY
Qty: 90 TABLET | Refills: 0 | Status: SHIPPED | OUTPATIENT
Start: 2023-08-09 | End: 2023-11-07

## 2023-08-09 NOTE — TELEPHONE ENCOUNTER
Atorvastatin (Lipitor) 20 mg    Last Office Visit: 10/31/22  Future [facility] Appointments: 8/28/22  Medication last refilled: 8/9/22 #90 with 4 refill(s)    Required labs per protocol:    LAB REF RANGE 2/25/21 8/9/22   LDL < 100 mg/dL 103 93     Medication is being filled for 1 time refill only due to:  Patient needs labs Lipid panel. Patient needs to be seen because due for physical which is scheduled for 8/28/23.    Pat Casey, NAGAN, RN, CCM

## 2023-08-21 ASSESSMENT — ENCOUNTER SYMPTOMS
MYALGIAS: 0
PALPITATIONS: 0
PARESTHESIAS: 0
HEMATOCHEZIA: 0
DIARRHEA: 0
NERVOUS/ANXIOUS: 0
HEARTBURN: 0
WEAKNESS: 0
FREQUENCY: 0
CHILLS: 0
DIZZINESS: 0
FEVER: 0
NAUSEA: 0
ABDOMINAL PAIN: 0
HEMATURIA: 0
SHORTNESS OF BREATH: 0
DYSURIA: 0
COUGH: 1
CONSTIPATION: 0
SORE THROAT: 0
ARTHRALGIAS: 1
HEADACHES: 0
EYE PAIN: 0
JOINT SWELLING: 0

## 2023-08-25 DIAGNOSIS — I10 ESSENTIAL HYPERTENSION, BENIGN: ICD-10-CM

## 2023-08-25 RX ORDER — HYDROCHLOROTHIAZIDE 12.5 MG/1
12.5 TABLET ORAL DAILY
Qty: 90 TABLET | Refills: 0 | Status: SHIPPED | OUTPATIENT
Start: 2023-08-25 | End: 2024-09-22 | Stop reason: DRUGHIGH

## 2023-08-25 NOTE — TELEPHONE ENCOUNTER
Hydrochlorothiazide (Hydrodiuril) 12.5 mg    Last Office Visit: 10/31/22  Future [facility] Appointments: 8/28/23  Medication last refilled: 8/9/22 #90 with 4 refill(s)    Vital Signs Systolic Diastolic   10/31/22 149 88   3/25/22 146 87   3/5/21 162 97     Required labs per protocol:    LAB REF RANGE 3/25/22 8/9/22   Creatinine 0.67-1.17 mg/dL 0.85 0.76   Potassium 3.4-5.3 mmol/L 4.2 4.3   Sodium 137.3-146.3 mmol/L 142 142     Medication is being filled for 1 time refill only due to:  Patient needs labs BMP. Has upcoming appointment 8/28/23    NAGA MaeN, RN, CCM

## 2023-08-28 ENCOUNTER — OFFICE VISIT (OUTPATIENT)
Dept: FAMILY MEDICINE | Facility: CLINIC | Age: 58
End: 2023-08-28
Payer: COMMERCIAL

## 2023-08-28 VITALS
SYSTOLIC BLOOD PRESSURE: 156 MMHG | HEIGHT: 73 IN | OXYGEN SATURATION: 98 % | BODY MASS INDEX: 35.19 KG/M2 | WEIGHT: 265.5 LBS | TEMPERATURE: 98.6 F | DIASTOLIC BLOOD PRESSURE: 90 MMHG | HEART RATE: 79 BPM

## 2023-08-28 DIAGNOSIS — Z12.5 SCREENING FOR PROSTATE CANCER: ICD-10-CM

## 2023-08-28 DIAGNOSIS — E11.9 TYPE 2 DIABETES MELLITUS WITHOUT COMPLICATION, WITHOUT LONG-TERM CURRENT USE OF INSULIN (H): ICD-10-CM

## 2023-08-28 DIAGNOSIS — Z00.00 WELLNESS EXAMINATION: Primary | ICD-10-CM

## 2023-08-28 DIAGNOSIS — M72.2 PLANTAR FASCIITIS: ICD-10-CM

## 2023-08-28 DIAGNOSIS — I10 ESSENTIAL HYPERTENSION, BENIGN: ICD-10-CM

## 2023-08-28 DIAGNOSIS — R06.83 LOUD SNORING: ICD-10-CM

## 2023-08-28 LAB
ANION GAP SERPL CALCULATED.3IONS-SCNC: 12 MMOL/L (ref 7–15)
BUN SERPL-MCNC: 15.9 MG/DL (ref 6–20)
CALCIUM SERPL-MCNC: 9 MG/DL (ref 8.6–10)
CHLORIDE SERPL-SCNC: 107 MMOL/L (ref 98–107)
CHOLEST SERPL-MCNC: 149 MG/DL
CREAT SERPL-MCNC: 0.79 MG/DL (ref 0.67–1.17)
CREAT UR-MCNC: 118 MG/DL
DEPRECATED HCO3 PLAS-SCNC: 25 MMOL/L (ref 22–29)
GFR SERPL CREATININE-BSD FRML MDRD: >90 ML/MIN/1.73M2
GLUCOSE SERPL-MCNC: 142 MG/DL (ref 70–99)
HBA1C MFR BLD: 7.2 % (ref 0–5.6)
HDLC SERPL-MCNC: 43 MG/DL
LDLC SERPL CALC-MCNC: 84 MG/DL
MICROALBUMIN UR-MCNC: 13.1 MG/L
MICROALBUMIN/CREAT UR: 11.1 MG/G CR (ref 0–17)
NONHDLC SERPL-MCNC: 106 MG/DL
POTASSIUM SERPL-SCNC: 4.1 MMOL/L (ref 3.4–5.3)
PSA SERPL DL<=0.01 NG/ML-MCNC: 1.09 NG/ML (ref 0–3.5)
SODIUM SERPL-SCNC: 144 MMOL/L (ref 136–145)
TRIGL SERPL-MCNC: 110 MG/DL

## 2023-08-28 PROCEDURE — 80048 BASIC METABOLIC PNL TOTAL CA: CPT | Performed by: FAMILY MEDICINE

## 2023-08-28 PROCEDURE — G0103 PSA SCREENING: HCPCS | Performed by: FAMILY MEDICINE

## 2023-08-28 PROCEDURE — 80061 LIPID PANEL: CPT | Performed by: FAMILY MEDICINE

## 2023-08-28 PROCEDURE — 82570 ASSAY OF URINE CREATININE: CPT | Performed by: FAMILY MEDICINE

## 2023-08-28 RX ORDER — HYDROCHLOROTHIAZIDE 12.5 MG/1
12.5 TABLET ORAL DAILY
Qty: 90 TABLET | Refills: 0 | Status: CANCELLED | OUTPATIENT
Start: 2023-08-28

## 2023-08-28 RX ORDER — HYDROCHLOROTHIAZIDE 25 MG/1
25 TABLET ORAL DAILY
Qty: 90 TABLET | Refills: 4 | Status: SHIPPED | OUTPATIENT
Start: 2023-08-28 | End: 2024-09-12

## 2023-08-28 NOTE — NURSING NOTE
"58 year old    Chief Complaint   Patient presents with    Physical     Lingering cough after summer cold (5-6 weeks ago), possible plantar fascitis and sleep apnea, home BP's average around 140/80            Blood pressure (!) 156/90, pulse 79, temperature 98.6  F (37  C), temperature source Temporal, height 1.842 m (6' 0.5\"), weight 120.4 kg (265 lb 8 oz), SpO2 98 %. Body mass index is 35.51 kg/m .    Patient Active Problem List   Diagnosis    Insomnia    Essential hypertension, benign    Wellness examination    Type 2 diabetes mellitus without complication, without long-term current use of insulin (H)    Morbid obesity (H)    Diverticulitis of colon              Wt Readings from Last 2 Encounters:   08/28/23 120.4 kg (265 lb 8 oz)   10/31/22 122.5 kg (270 lb)       BP Readings from Last 3 Encounters:   08/28/23 (!) 156/90   10/31/22 (!) 149/88   08/09/22 (!) 143/81                Current Outpatient Medications   Medication    Aspirin 81 MG TBDP    atorvastatin (LIPITOR) 20 MG tablet    blood glucose monitoring (NO BRAND SPECIFIED) meter device kit    blood glucose monitoring (NO BRAND SPECIFIED) test strip    Ginkgo Biloba (GINKGO PO)    Ginseng (ELEUTHERO PO)    hydrochlorothiazide (HYDRODIURIL) 12.5 MG tablet    insulin  UNIT/ML vial    insulin syringe-needle U-100 (30G X 1/2\" 1 ML) 30G X 1/2\" 1 ML miscellaneous    Lancets Misc. (MULTI-LANCET DEVICE 2) KIT    lisinopril (ZESTRIL) 40 MG tablet    metFORMIN (GLUCOPHAGE XR) 500 MG 24 hr tablet    metroNIDAZOLE (FLAGYL) 500 MG tablet    propranolol ER (INDERAL LA) 160 MG 24 hr capsule    amoxicillin-clavulanate (AUGMENTIN) 875-125 MG tablet     No current facility-administered medications for this visit.              Social History     Tobacco Use    Smoking status: Never    Smokeless tobacco: Never   Substance Use Topics    Alcohol use: Yes     Alcohol/week: 2.0 standard drinks of alcohol     Types: 2 Glasses of wine per week     Comment: rarely    Drug use: " No              Health Maintenance Due   Topic Date Due    ADVANCE CARE PLANNING  Never done    HIV SCREENING  Never done    COVID-19 Vaccine (5 - Pfizer series) 03/02/2022    A1C  04/30/2023    BMP  08/09/2023    LIPID  08/09/2023    MICROALBUMIN  08/09/2023    DIABETIC FOOT EXAM  08/09/2023    EYE EXAM  08/19/2023    INFLUENZA VACCINE (1) 09/01/2023            No results found for: PAP           August 28, 2023 9:15 AM

## 2023-08-28 NOTE — PROGRESS NOTES
CHIEF COMPLAINT: Annual Wellness Exam      HISTORY OF PRESENT ILLNESS:  Dougie Nino is a 58 year old male who presents for an annual wellness visit.  Overall, he is doing well. He wears glasses and eye care is up to date. No hearing concerns. His dental care is up to date, has an upcoming dental visit tomorrow.. His BP is 156/90 today. Body mass index is 35.51 kg/m . From an immunization standpoint, he is eligible for a Covid bivalent booster, but is otherwise completely up to date on immunizations.     He had colon cancer screening via colonoscopy on 12/14/2022, 2 tubular adenomas, next due 2027. He is due for prostate cancer screening.     Dougie has a history of type 2 diabetes mellitus without complication and is currently taking metformin 1000 mg twice daily and insulin NPH 25 units twice daily. His most recent A1c was 7.8% on 10/31/2022. His BP is 156/90 today and he is on lisinopril 40 mg daily, hydrochlorothiazide 12.5 mg daily, and propanolol  mg daily. His home BPs have typically been around 140/80. His most recent LDL was 93 on 8/9/2022 and he is currently taking atorvastatin 20 mg daily. He is on aspirin 81 mg daily and does not smoke.      Dougie reports a persistent cough after a summer cold about 5-6 weeks ago. He notes that he always tends to have a longstanding cough after chest colds or other. He took Zyrtec with some improvement. He is not especially bothered by this cough, but his wife has been. He does not have an inhaler and declines one today.    Dougie reports he has plantar fasciitis and he has been avoiding shoes with hard soles. He has soft-soled shoes for both outdoor and indoor wear. He notes that he had to wear these shoes throughout his recent move, wherein he did most of the physical labor. Dougie also notes bilateral calluses at his heels. He is interested in a referral to podiatry.     Dougie reports significant snoring which he has been unable to stop. His wife will frequently nudge  "him to stop his snoring, but he will restart very quickly. They have specifically discussed whether he stops breathing at night, and she does not feel this is the case. However, Dougie would really like to find a way to control his snoring as he knows it disturbs his wife's sleep daily and even putting covers over his head is not very helpful.      FAMILY, SOCIAL AND PAST SURGICAL HISTORIES: Updated      MEDICATIONS:   Carefully Reviewed      REVIEW OF SYSTEMS:  10-point review of systems negative unless otherwise stated in the HPI.       OBJECTIVE:    EXAM:    GENERAL: He is in no distress.  Affect is upbeat.   Alert and oriented x3  BP (!) 156/90 (BP Location: Right arm, Patient Position: Sitting, Cuff Size: Adult Large)   Pulse 79   Temp 98.6  F (37  C) (Temporal)   Ht 1.842 m (6' 0.5\")   Wt 120.4 kg (265 lb 8 oz)   SpO2 98%   BMI 35.51 kg/m    HEENT:  Head is normocephalic, atraumatic. Ears clear bilaterally. No pain with palpation of the frontal or maxillary sinuses.  Eyes are grossly normal.  Nose is free of any congestion or discharge.  Teeth in good repair.  Mucous membranes are moist.  Throat looks benign.  Neck is supple without adenopathy or thyromegaly.  No carotid bruits are heard, no JVD.   BACK:  Smooth and straight.  No pain to percussion.  No CVA tenderness.   LUNGS:  Clear to auscultation bilaterally.   HEART:  Regular rate and rhythm without murmur.   EXTREMITIES:  Ankles free of any edema.   SKIN:  Warm and dry.   FEET: Sensation intact in all 10 toes. Callus present in both heels. Pain is present with palpation over the calcaneus bilaterally, consistent with plantar fasciitis.      LABORATORY DATA: Labs, pending.       ASSESSMENT AND PLAN:   Annual Wellness Exam: Up to date with healthcare maintenance strategies.  Post-infectious cough: If cough recurs, contact the clinic and I will prescribe Flovent or fluticasone MDI.   Type 2 diabetes, at neart optimal diabetic care. A1c is 7.2%. Blood " pressure is ~ less than 140/90. LDL was 93. He is on aspirin and does not smoke. Foot exam completed. BMP, lipid panel, A1c, Albumin Random Urine Quantitative with Creat Ratio ordered. He does have bilateral calluses and I gave referral to podiatry for evaluation and treatment of this along with his plantar fasciitis pain.   Essential hypertension, under less than ideal control: BMP ordered. Increase hydrochlorothiazide from 12.5 mg daily to 25 mg daily. Contact me when refills are needed.   Loud snoring: Recommended he ask his dentist about dental appliances for snoring at his upcoming dental visit.   Plantar fasciitis: Referred to podiatry for evaluation and treatment.   Screening for prostate cancer: PSA ordered.   Follow up in 1 year or call if there are any questions or concerns.       I, Maryuri Mallory, am serving as a scribe to document services personally performed by Dr. Live Richards, based on data collection and the provider's statements to me. Dr. Richards has reviewed, edited, and approved the above note.      I, Dr. Live Richards, have interviewed and examined this patient, and I have thoroughly reviewed and edited this note and agree with its contents.

## 2023-09-05 DIAGNOSIS — E11.9 TYPE 2 DIABETES MELLITUS WITHOUT COMPLICATION, WITHOUT LONG-TERM CURRENT USE OF INSULIN (H): ICD-10-CM

## 2023-09-07 RX ORDER — METFORMIN HCL 500 MG
1000 TABLET, EXTENDED RELEASE 24 HR ORAL 2 TIMES DAILY WITH MEALS
Qty: 360 TABLET | Refills: 3 | Status: SHIPPED | OUTPATIENT
Start: 2023-09-07 | End: 2024-09-03

## 2023-09-07 NOTE — TELEPHONE ENCOUNTER
Medication requested: metFORMIN (GLUCOPHAGE XR) 500 MG 24 hr tablet   Last office visit: 8/28/23  [facility] appointments: none  Medication last refilled: 8/9/22; 360 + 4 refills  Last qualifying labs:   Component      Latest Ref Rng 8/28/2023  10:07 AM   Hemoglobin A1C      0.0 - 5.6 % 7.2 (H)      Component      Latest Ref Rng 8/28/2023  10:07 AM   Creatinine      0.67 - 1.17 mg/dL 0.79      Prescription approved per [facility] Refill Protocol.    Alhaji BURCIAGA, RN  09/07/23 11:33 AM

## 2023-09-12 ENCOUNTER — TRANSFERRED RECORDS (OUTPATIENT)
Dept: HEALTH INFORMATION MANAGEMENT | Facility: CLINIC | Age: 58
End: 2023-09-12
Payer: COMMERCIAL

## 2023-11-02 DIAGNOSIS — I10 ESSENTIAL HYPERTENSION, BENIGN: ICD-10-CM

## 2023-11-03 RX ORDER — LISINOPRIL 40 MG/1
40 TABLET ORAL DAILY
Qty: 90 TABLET | Refills: 2 | Status: SHIPPED | OUTPATIENT
Start: 2023-11-03 | End: 2024-08-05

## 2023-11-03 NOTE — TELEPHONE ENCOUNTER
Medication requested: lisinopril (ZESTRIL) 40 MG tablet   Last office visit: 8/28/23  [facility] appointments: none  Medication last refilled: 8/9/22; 90 + 4 refills  Last qualifying labs:   BP Readings from Last 3 Encounters:   08/28/23 (!) 156/90   10/31/22 (!) 149/88   08/09/22 (!) 143/81     Component      Latest Ref Rng 8/28/2023  10:07 AM   Potassium      3.4 - 5.3 mmol/L 4.1      Component      Latest Ref Rng 8/28/2023  10:07 AM   Creatinine      0.67 - 1.17 mg/dL 0.79      Prescription approved per [facility] Refill Protocol.    Alhaji BURCIAGA, RN  11/03/23 8:54 AM

## 2023-11-07 DIAGNOSIS — I10 ESSENTIAL HYPERTENSION, BENIGN: ICD-10-CM

## 2023-11-07 DIAGNOSIS — Z91.89 AT RISK FOR CARDIOVASCULAR EVENT: ICD-10-CM

## 2023-11-07 RX ORDER — PROPRANOLOL HYDROCHLORIDE 160 MG/1
160 CAPSULE, EXTENDED RELEASE ORAL DAILY
Qty: 90 CAPSULE | Refills: 4 | Status: SHIPPED | OUTPATIENT
Start: 2023-11-07 | End: 2024-09-12

## 2023-11-07 RX ORDER — ATORVASTATIN CALCIUM 20 MG/1
20 TABLET, FILM COATED ORAL DAILY
Qty: 90 TABLET | Refills: 4 | Status: SHIPPED | OUTPATIENT
Start: 2023-11-07 | End: 2024-09-12

## 2023-11-07 NOTE — TELEPHONE ENCOUNTER
Medication requested: atorvastatin (LIPITOR) 20 MG tablet   Last office visit: 8/28/2023  [facility] appointments: none  Medication last refilled: 8/9/2023; 90 + 0 refills  Last qualifying labs:     Medication requested: propranolol ER (INDERAL LA) 160 MG 24 hr capsule   Medication last refilled: 8/9/2022; 90 + 4 refills  Last qualifying labs:     BP Readings from Last 3 Encounters:   08/28/23 (!) 156/90   10/31/22 (!) 149/88   08/09/22 (!) 143/81     Component      Latest Ref Rng 8/28/2023  10:07 AM   LDL Cholesterol Calculated      <=100 mg/dL 84      Prescription approved per [facility] Refill Protocol.    GUALBERTO Jones, RN  11/07/23, 4:39 PM

## 2023-12-21 DIAGNOSIS — G47.00 INSOMNIA, UNSPECIFIED TYPE: ICD-10-CM

## 2023-12-21 NOTE — TELEPHONE ENCOUNTER
Zolpidem (Ambien) 10 mg    Last Office Visit: 8/28/23  [facility] Appointments: None  Medication last refilled: 3/25/22 #30 with 0 refill(s)     verified - last fill date: None in the past year    Routing refill request to provider for review/approval because:  Drug not on the [facility] refill protocol     GUALBERTO Mae, RN, CCM

## 2023-12-22 RX ORDER — ZOLPIDEM TARTRATE 10 MG/1
10 TABLET ORAL
Qty: 30 TABLET | Refills: 0 | Status: SHIPPED | OUTPATIENT
Start: 2023-12-22 | End: 2024-09-12

## 2023-12-22 NOTE — TELEPHONE ENCOUNTER
Med restarted as requested during Dr. Richards's absence.   Dougie was seen today for refill request.    Diagnoses and all orders for this visit:    Insomnia, unspecified type  -     zolpidem (AMBIEN) 10 MG tablet; Take 1 tablet (10 mg) by mouth nightly as needed for sleep      Brian Calixto MD  7:46 AM, December 22, 2023

## 2024-03-17 ENCOUNTER — HEALTH MAINTENANCE LETTER (OUTPATIENT)
Age: 59
End: 2024-03-17

## 2024-04-19 ENCOUNTER — TELEPHONE (OUTPATIENT)
Dept: FAMILY MEDICINE | Facility: CLINIC | Age: 59
End: 2024-04-19

## 2024-04-19 DIAGNOSIS — E11.9 TYPE 2 DIABETES MELLITUS WITHOUT COMPLICATION, WITHOUT LONG-TERM CURRENT USE OF INSULIN (H): Primary | ICD-10-CM

## 2024-04-19 NOTE — TELEPHONE ENCOUNTER
General (use ONLY if request does not fit into other dot phrase categories)    Who is calling - Dougie  Reason for call - Requesting labs   Action desired - A1C labs   Return call needed? Maybe, he might leave a [facility] message   Advised patient that response may take up to 1-2 business days? Yes  Ok to leave a message on VM? yes

## 2024-04-19 NOTE — TELEPHONE ENCOUNTER
Placing HGBA1C lab order per pt request, which is due every 6 months.    GUALBERTO Jones, RN  04/19/24, 1:28 PM

## 2024-08-05 DIAGNOSIS — I10 ESSENTIAL HYPERTENSION, BENIGN: ICD-10-CM

## 2024-08-07 RX ORDER — LISINOPRIL 40 MG/1
40 TABLET ORAL DAILY
Qty: 90 TABLET | Refills: 0 | Status: SHIPPED | OUTPATIENT
Start: 2024-08-07 | End: 2024-09-12

## 2024-08-07 NOTE — TELEPHONE ENCOUNTER
Medication requested: lisinopril (ZESTRIL) 40 MG tablet   Last office visit: 8/28/2023  [facility] appointments: 9/4/2024  Medication last refilled: 11/3/2023; 90 tabs + 2 refills  Last qualifying labs:     BP Readings from Last 3 Encounters:   08/28/23 (!) 156/90   10/31/22 (!) 149/88   08/09/22 (!) 143/81     Component      Latest Ref Rng 8/28/2023  10:07 AM   GFR Estimate      >60 mL/min/1.73m2 >90      Component      Latest Ref Rng 8/28/2023  10:07 AM   Potassium      3.4 - 5.3 mmol/L 4.1      Medication is being filled for 1 time refill only due to:   pt has upcoming annual physical.    NAGA JonesN, RN  08/07/24, 2:14 PM

## 2024-09-03 DIAGNOSIS — E11.9 TYPE 2 DIABETES MELLITUS WITHOUT COMPLICATION, WITHOUT LONG-TERM CURRENT USE OF INSULIN (H): ICD-10-CM

## 2024-09-04 RX ORDER — METFORMIN HCL 500 MG
1000 TABLET, EXTENDED RELEASE 24 HR ORAL 2 TIMES DAILY WITH MEALS
Qty: 360 TABLET | Refills: 0 | Status: SHIPPED | OUTPATIENT
Start: 2024-09-04 | End: 2024-09-12

## 2024-09-04 NOTE — TELEPHONE ENCOUNTER
Medication requested: metFORMIN (GLUCOPHAGE XR) 500 MG 24 hr tablet   Last office visit: 8/28/2023  [facility] appointments: 9/12/2024  Medication last refilled: 9/7/2023; 360 tabs + 3 refills  Last qualifying labs:     Component      Latest Ref Rng 8/28/2023  10:07 AM   Hemoglobin A1C      0.0 - 5.6 % 7.2 (H)       Component      Latest Ref Rng 8/28/2023  10:07 AM   GFR Estimate      >60 mL/min/1.73m2 >90      Medication is being filled for 1 time refill only due to:   pt has upcoming annual physical    GUALBERTO Jones, RN  09/04/24, 2:52 PM

## 2024-09-12 ENCOUNTER — OFFICE VISIT (OUTPATIENT)
Dept: FAMILY MEDICINE | Facility: CLINIC | Age: 59
End: 2024-09-12
Payer: COMMERCIAL

## 2024-09-12 VITALS
SYSTOLIC BLOOD PRESSURE: 137 MMHG | RESPIRATION RATE: 12 BRPM | OXYGEN SATURATION: 97 % | BODY MASS INDEX: 35.89 KG/M2 | HEART RATE: 80 BPM | HEIGHT: 72 IN | TEMPERATURE: 98.2 F | DIASTOLIC BLOOD PRESSURE: 87 MMHG | WEIGHT: 265 LBS

## 2024-09-12 DIAGNOSIS — G47.00 INSOMNIA, UNSPECIFIED TYPE: ICD-10-CM

## 2024-09-12 DIAGNOSIS — E11.9 TYPE 2 DIABETES MELLITUS WITHOUT COMPLICATION, WITHOUT LONG-TERM CURRENT USE OF INSULIN (H): ICD-10-CM

## 2024-09-12 DIAGNOSIS — Z00.00 WELLNESS EXAMINATION: Primary | ICD-10-CM

## 2024-09-12 DIAGNOSIS — Z12.5 SCREENING FOR PROSTATE CANCER: ICD-10-CM

## 2024-09-12 DIAGNOSIS — I10 ESSENTIAL HYPERTENSION, BENIGN: ICD-10-CM

## 2024-09-12 DIAGNOSIS — Z91.89 AT RISK FOR CARDIOVASCULAR EVENT: ICD-10-CM

## 2024-09-12 DIAGNOSIS — L08.9 SKIN INFECTION: ICD-10-CM

## 2024-09-12 DIAGNOSIS — E78.5 HYPERLIPIDEMIA LDL GOAL <100: ICD-10-CM

## 2024-09-12 LAB — HBA1C MFR BLD: 8.4 % (ref 0–5.6)

## 2024-09-12 PROCEDURE — 80061 LIPID PANEL: CPT | Performed by: FAMILY MEDICINE

## 2024-09-12 PROCEDURE — G0103 PSA SCREENING: HCPCS | Performed by: FAMILY MEDICINE

## 2024-09-12 PROCEDURE — 82043 UR ALBUMIN QUANTITATIVE: CPT | Performed by: FAMILY MEDICINE

## 2024-09-12 PROCEDURE — 80048 BASIC METABOLIC PNL TOTAL CA: CPT | Performed by: FAMILY MEDICINE

## 2024-09-12 PROCEDURE — 84460 ALANINE AMINO (ALT) (SGPT): CPT | Performed by: FAMILY MEDICINE

## 2024-09-12 RX ORDER — PROPRANOLOL HYDROCHLORIDE 160 MG/1
160 CAPSULE, EXTENDED RELEASE ORAL DAILY
Qty: 90 CAPSULE | Refills: 4 | Status: SHIPPED | OUTPATIENT
Start: 2024-09-12

## 2024-09-12 RX ORDER — LISINOPRIL 40 MG/1
40 TABLET ORAL DAILY
Qty: 90 TABLET | Refills: 4 | Status: SHIPPED | OUTPATIENT
Start: 2024-09-12

## 2024-09-12 RX ORDER — ATORVASTATIN CALCIUM 20 MG/1
20 TABLET, FILM COATED ORAL DAILY
Qty: 90 TABLET | Refills: 4 | Status: SHIPPED | OUTPATIENT
Start: 2024-09-12

## 2024-09-12 RX ORDER — ZOLPIDEM TARTRATE 10 MG/1
10 TABLET ORAL
Qty: 30 TABLET | Refills: 0 | Status: SHIPPED | OUTPATIENT
Start: 2024-09-12

## 2024-09-12 RX ORDER — METFORMIN HCL 500 MG
1000 TABLET, EXTENDED RELEASE 24 HR ORAL 2 TIMES DAILY WITH MEALS
Qty: 360 TABLET | Refills: 0 | Status: SHIPPED | OUTPATIENT
Start: 2024-09-12

## 2024-09-12 RX ORDER — HYDROCHLOROTHIAZIDE 25 MG/1
25 TABLET ORAL DAILY
Qty: 90 TABLET | Refills: 4 | Status: SHIPPED | OUTPATIENT
Start: 2024-09-12

## 2024-09-12 RX ORDER — MUPIROCIN 20 MG/G
OINTMENT TOPICAL 3 TIMES DAILY
Qty: 30 G | Refills: 1 | Status: SHIPPED | OUTPATIENT
Start: 2024-09-12

## 2024-09-12 NOTE — PROGRESS NOTES
Preventive Care Visit  [facility]  Live Richards MD, Family Medicine  Sep 12, 2024    Subjective   Dougie is a 59 year old, presenting for the following:  Physical, Medication Problem (Has noticed on multiple occasions that there is an undissolved capsule in his stool - suspects it to be the propanolo but unsure), and Medication Request (Mupirocin PRN and insulin syringes/needles 31G x 8mm 0.3mL)    Via the Health Maintenance questionnaire, the patient has reported the following services have been completed -Eye Exam: [facility] 2023-10-01, this information has not been sent to the abstraction team.    HPI    Dougie is a 59-year-old here today for his annual wellness visit.  Overall, he is doing quite well.    He no longer works for [employer].  He has a new job and he is enjoying it.    Dougie has type 2 diabetes without complication, with long-term current use of insulin.  He takes care of this with metformin and insulin, as well as diet and exercise.  He should be at optimal care.  A1c should be less than 8%; his blood pressure is less than 140/90; his LDL should be less than 100; he is on aspirin 81 mg daily; and he has never smoked.    One of his concerns that on several occasions he has seen an undissolved capsule in his stool.  He suspects it might be the propranolol capsule but he is not sure.  Most of his medications are tablets.  Despite this, his blood pressure has been at goal.  Today is no exception, coming in at 137/87.    He would like to have some mupirocin on hand for skin infections when and if they occur.  In addition, he needs some insulin syringes sent in.    From a care gap standpoint, he would likely get a seasonal flu shot and a COVID booster.  He has had an eye exam and we will try and track down those records.  Beyond that, he is really up-to-date with all recommended healthcare maintenance strategies.        9/12/2024   General Health   How would you rate your overall physical health?  Excellent   Feel stress (tense, anxious, or unable to sleep) Only a little      (!) STRESS CONCERN      9/12/2024   Nutrition   Three or more servings of calcium each day? Yes   Diet: Regular (no restrictions)   How many servings of fruit and vegetables per day? (!) 2-3   How many sweetened beverages each day? 0-1            9/12/2024   Exercise   Days per week of moderate/strenous exercise 7 days   Average minutes spent exercising at this level 30 min            9/12/2024   Social Factors   Frequency of gathering with friends or relatives Once a week   Worry food won't last until get money to buy more No   Food not last or not have enough money for food? No   Do you have housing? (Housing is defined as stable permanent housing and does not include staying ouside in a car, in a tent, in an abandoned building, in an overnight shelter, or couch-surfing.) Yes   Are you worried about losing your housing? No   Lack of transportation? No   Unable to get utilities (heat,electricity)? No            9/12/2024   Fall Risk   Fallen 2 or more times in the past year? No   Trouble with walking or balance? No             9/12/2024   Dental   Dentist two times every year? Yes            9/3/2024   TB Screening   Were you born outside of the [place]? No      Today's PHQ-2 Score:       9/12/2024    11:08 AM   PHQ-2 ( 1999 Pfizer)   Q1: Little interest or pleasure in doing things 0   Q2: Feeling down, depressed or hopeless 0   PHQ-2 Score 0   Q1: Little interest or pleasure in doing things Not at all   Q2: Feeling down, depressed or hopeless Not at all   PHQ-2 Score 0           9/12/2024   Substance Use   Alcohol more than 3/day or more than 7/wk No   Do you use any other substances recreationally? No        Social History     Tobacco Use    Smoking status: Never    Smokeless tobacco: Never   Substance Use Topics    Alcohol use: Yes     Alcohol/week: 2.0 standard drinks of alcohol     Types: 2 Glasses of wine per week     Comment: rarely  "   Drug use: No           9/12/2024   One time HIV Screening   Previous HIV test? No          9/12/2024   STI Screening   New sexual partner(s) since last STI/HIV test? No      Last PSA:   PSA   Date Value Ref Range Status   02/25/2021 2.02 0 - 4 ug/L Final     Comment:     Assay Method:  Chemiluminescence using Siemens Vista analyzer     Prostate Specific Antigen Screen   Date Value Ref Range Status   08/28/2023 1.09 0.00 - 3.50 ng/mL Final     ASCVD Risk   The 10-year ASCVD risk score (Carolyn VELARDE, et al., 2019) is: 15.4%    Values used to calculate the score:      Age: 59 years      Sex: Male      Is Non- : No      Diabetic: Yes      Tobacco smoker: No      Systolic Blood Pressure: 137 mmHg      Is BP treated: Yes      HDL Cholesterol: 48 mg/dL      Total Cholesterol: 168 mg/dL      Review of Systems  Constitutional, neuro, ENT, endocrine, pulmonary, cardiac, gastrointestinal, genitourinary, musculoskeletal, integument and psychiatric systems are negative, except as otherwise noted.     Objective    Exam  /87 (BP Location: Left arm, Patient Position: Sitting, Cuff Size: Adult Large)   Pulse 80   Temp 98.2  F (36.8  C) (Skin)   Resp 12   Ht 1.834 m (6' 0.21\")   Wt 120.2 kg (265 lb)   SpO2 97%   BMI 35.74 kg/m       Estimated body mass index is 35.74 kg/m  as calculated from the following:    Height as of this encounter: 1.834 m (6' 0.21\").    Weight as of this encounter: 120.2 kg (265 lb).    Physical Exam  GENERAL: alert and no distress  EYES: Eyes grossly normal to inspection, PERRL and conjunctivae and sclerae normal  HENT: ear canals and TM's normal, nose and mouth without ulcers or lesions  NECK: no adenopathy, no asymmetry, masses, or scars  RESP: lungs clear to auscultation - no rales, rhonchi or wheezes  CV: regular rate and rhythm, normal S1 S2, no S3 or S4, no murmur, click or rub, no peripheral edema  MS: no gross musculoskeletal defects noted, no edema  SKIN: " no suspicious lesions or rashes  NEURO: Normal strength and tone, mentation intact and speech normal  PSYCH: mentation appears normal, affect normal/bright    Laboratory studies: Lipid panel, ALT, basic metabolic panel, A1c, urine microalbumin, and PSA, all pending      Assessment/Plan:    Dougie is a 59-year-old here today for his annual wellness visit.  Overall, doing quite well.    Type 2 diabetes mellitus without complication, with long-term current use of insulin, hopefully at optimal care.  This is the best his blood pressure has looked.  No changes there.  He will be notified the laboratory results.    Undissolved capsule noticed in the stool.  I suspect that the contents are being absorbed and simply capsule a part of the capsule that he is seeing.  I will check with our pharmacist about the nature of this.    Mupirocin refill sent in, as well as insulin syringes.    I look forward to seeing Delicia back in 4 to 6 months for diabetic follow-up, and in approximately 1 year for his next annual wellness visit.      Signed Electronically by: Live Richards MD

## 2024-09-12 NOTE — NURSING NOTE
"Dougie  59 year old    Chief Complaint   Patient presents with    Physical    Medication Problem     Has noticed on multiple occasions that there is an undissolved capsule in his stool - suspects it to be the propanolo but unsure    Medication Request     Mupirocin PRN and insulin syringes/needles 31G x 8mm 0.3mL            Blood pressure 137/87, pulse 80, temperature 98.2  F (36.8  C), temperature source Skin, resp. rate 12, height 1.834 m (6' 0.21\"), weight 120.2 kg (265 lb), SpO2 97%. Body mass index is 35.74 kg/m .    Patient Active Problem List   Diagnosis    Insomnia    Essential hypertension, benign    Wellness examination    Type 2 diabetes mellitus without complication, without long-term current use of insulin (H)    Morbid obesity (H)    Diverticulitis of colon    Loud snoring    Plantar fasciitis              Wt Readings from Last 2 Encounters:   09/12/24 120.2 kg (265 lb)   08/28/23 120.4 kg (265 lb 8 oz)       BP Readings from Last 3 Encounters:   09/12/24 137/87   08/28/23 (!) 156/90   10/31/22 (!) 149/88                Current Outpatient Medications   Medication Sig Dispense Refill    Aspirin 81 MG TBDP Take 1 tablet by mouth daily      atorvastatin (LIPITOR) 20 MG tablet Take 1 tablet (20 mg) by mouth daily 90 tablet 4    blood glucose monitoring (NO BRAND SPECIFIED) meter device kit Use to test blood sugar 2 times daily or as directed. 1 kit 0    blood glucose monitoring (NO BRAND SPECIFIED) test strip Use to test blood sugars 2 times daily or as directed 100 strip 3    Ginkgo Biloba (GINKGO PO) Take 1 tablet by mouth daily      Ginseng (ELEUTHERO PO) Take 1 tablet by mouth daily      hydrochlorothiazide (HYDRODIURIL) 25 MG tablet Take 1 tablet (25 mg) by mouth daily 90 tablet 4    insulin  UNIT/ML vial 25 Units Inject 25 Units Subcutaneous in the morning and 25 units subcutaneously in the evening 10 mL 0    insulin syringe-needle U-100 (30G X 1/2\" 1 ML) 30G X 1/2\" 1 ML miscellaneous Use 2 " "syringes daily or as directed. 100 each 3    Lancets Misc. (MULTI-LANCET DEVICE 2) KIT 1 lancet 2 times daily As needed to check blood sugar levels 1 kit 1    lisinopril (ZESTRIL) 40 MG tablet Take 1 tablet (40 mg) by mouth daily 90 tablet 0    metFORMIN (GLUCOPHAGE XR) 500 MG 24 hr tablet Take 2 tablets (1,000 mg) by mouth 2 times daily (with meals). 360 tablet 0    propranolol ER (INDERAL LA) 160 MG 24 hr capsule Take 1 capsule (160 mg) by mouth daily 90 capsule 4    zolpidem (AMBIEN) 10 MG tablet Take 1 tablet (10 mg) by mouth nightly as needed for sleep 30 tablet 0    hydrochlorothiazide (HYDRODIURIL) 12.5 MG tablet Take 1 tablet (12.5 mg) by mouth daily 90 tablet 0    metroNIDAZOLE (FLAGYL) 500 MG tablet Take 1 tablet (500 mg) by mouth 2 times daily (Patient not taking: Reported on 9/12/2024) 20 tablet 1     No current facility-administered medications for this visit.              Social History     Tobacco Use    Smoking status: Never    Smokeless tobacco: Never   Substance Use Topics    Alcohol use: Yes     Alcohol/week: 2.0 standard drinks of alcohol     Types: 2 Glasses of wine per week     Comment: rarely    Drug use: No              Health Maintenance Due   Topic Date Due    ADVANCE CARE PLANNING  Never done    HIV SCREENING  Never done    DIABETIC FOOT EXAM  08/09/2023    EYE EXAM  08/19/2023    A1C  02/28/2024    BMP  08/28/2024    LIPID  08/28/2024    MICROALBUMIN  08/28/2024    INFLUENZA VACCINE (1) 09/01/2024    COVID-19 Vaccine (6 - 2023-24 season) 09/01/2024    YEARLY PREVENTIVE VISIT  08/28/2024            No results found for: \"PAP\"           September 12, 2024 11:34 AM    "

## 2024-09-13 LAB
ALT SERPL W P-5'-P-CCNC: 33 U/L (ref 0–70)
ANION GAP SERPL CALCULATED.3IONS-SCNC: 12 MMOL/L (ref 7–15)
BUN SERPL-MCNC: 17.3 MG/DL (ref 6–20)
CALCIUM SERPL-MCNC: 9.4 MG/DL (ref 8.8–10.4)
CHLORIDE SERPL-SCNC: 101 MMOL/L (ref 98–107)
CHOLEST SERPL-MCNC: 168 MG/DL
CREAT SERPL-MCNC: 0.73 MG/DL (ref 0.67–1.17)
CREAT UR-MCNC: 82.3 MG/DL
EGFRCR SERPLBLD CKD-EPI 2021: >90 ML/MIN/1.73M2
FASTING STATUS PATIENT QL REPORTED: YES
FASTING STATUS PATIENT QL REPORTED: YES
GLUCOSE SERPL-MCNC: 195 MG/DL (ref 70–99)
HCO3 SERPL-SCNC: 27 MMOL/L (ref 22–29)
HDLC SERPL-MCNC: 48 MG/DL
LDLC SERPL CALC-MCNC: 87 MG/DL
MICROALBUMIN UR-MCNC: <12 MG/L
MICROALBUMIN/CREAT UR: NORMAL MG/G{CREAT}
NONHDLC SERPL-MCNC: 120 MG/DL
POTASSIUM SERPL-SCNC: 3.7 MMOL/L (ref 3.4–5.3)
PSA SERPL DL<=0.01 NG/ML-MCNC: 1.37 NG/ML (ref 0–3.5)
SODIUM SERPL-SCNC: 140 MMOL/L (ref 135–145)
TRIGL SERPL-MCNC: 166 MG/DL

## 2025-03-22 ENCOUNTER — HEALTH MAINTENANCE LETTER (OUTPATIENT)
Age: 60
End: 2025-03-22

## 2025-06-02 DIAGNOSIS — I10 ESSENTIAL HYPERTENSION, BENIGN: ICD-10-CM

## 2025-06-02 RX ORDER — PROPRANOLOL HYDROCHLORIDE 160 MG/1
160 CAPSULE, EXTENDED RELEASE ORAL DAILY
Qty: 90 CAPSULE | Refills: 0 | Status: SHIPPED | OUTPATIENT
Start: 2025-06-02

## 2025-06-02 NOTE — TELEPHONE ENCOUNTER
Transferring Propranolol  MG prescription to [facility] per pt request.    Nisreen Avalos, NAGAN, RN  06/02/25, 2:17 PM

## 2025-07-02 DIAGNOSIS — I10 ESSENTIAL HYPERTENSION, BENIGN: ICD-10-CM

## 2025-07-03 RX ORDER — HYDROCHLOROTHIAZIDE 25 MG/1
25 TABLET ORAL DAILY
Qty: 90 TABLET | Refills: 0 | Status: SHIPPED | OUTPATIENT
Start: 2025-07-03

## 2025-07-03 NOTE — TELEPHONE ENCOUNTER
Medication requested: hydrochlorothiazide (HYDRODIURIL) 25 MG tablet   Last office visit: 9/12/24  [facility] appointments: 7/9/25  Medication last refilled: 2/7/25; 90 + 1 refill  Last qualifying labs:   BP Readings from Last 3 Encounters:   09/12/24 137/87   08/28/23 (!) 156/90   10/31/22 (!) 149/88     Component      Latest Ref Rng 9/12/2024  12:41 PM   Sodium      135 - 145 mmol/L 140    Potassium      3.4 - 5.3 mmol/L 3.7      Component      Latest Ref Rng 9/12/2024  12:41 PM   GFR Estimate      >60 mL/min/1.73m2 >90      Prescription approved per [facility] Refill Protocol.    Alhaji BURCIAGA, RN  07/03/25 9:53 AM

## 2025-07-09 ENCOUNTER — OFFICE VISIT (OUTPATIENT)
Dept: FAMILY MEDICINE | Facility: CLINIC | Age: 60
End: 2025-07-09
Payer: COMMERCIAL

## 2025-07-09 VITALS
HEART RATE: 88 BPM | HEIGHT: 73 IN | SYSTOLIC BLOOD PRESSURE: 132 MMHG | WEIGHT: 275 LBS | TEMPERATURE: 97.5 F | DIASTOLIC BLOOD PRESSURE: 81 MMHG | BODY MASS INDEX: 36.45 KG/M2 | OXYGEN SATURATION: 96 %

## 2025-07-09 DIAGNOSIS — Z12.5 SCREENING FOR PROSTATE CANCER: ICD-10-CM

## 2025-07-09 DIAGNOSIS — E78.5 HYPERLIPIDEMIA LDL GOAL <100: ICD-10-CM

## 2025-07-09 DIAGNOSIS — E11.9 TYPE 2 DIABETES MELLITUS WITHOUT COMPLICATION, WITHOUT LONG-TERM CURRENT USE OF INSULIN (H): ICD-10-CM

## 2025-07-09 DIAGNOSIS — I10 ESSENTIAL HYPERTENSION, BENIGN: ICD-10-CM

## 2025-07-09 DIAGNOSIS — L08.9 SKIN INFECTION: ICD-10-CM

## 2025-07-09 DIAGNOSIS — Z00.00 WELLNESS EXAMINATION: Primary | ICD-10-CM

## 2025-07-09 DIAGNOSIS — G47.00 INSOMNIA, UNSPECIFIED TYPE: ICD-10-CM

## 2025-07-09 DIAGNOSIS — Z91.89 AT RISK FOR CARDIOVASCULAR EVENT: ICD-10-CM

## 2025-07-09 LAB
EST. AVERAGE GLUCOSE BLD GHB EST-MCNC: 174 MG/DL
HBA1C MFR BLD: 7.7 % (ref 0–5.6)

## 2025-07-09 PROCEDURE — 80061 LIPID PANEL: CPT | Performed by: FAMILY MEDICINE

## 2025-07-09 PROCEDURE — 80048 BASIC METABOLIC PNL TOTAL CA: CPT | Performed by: FAMILY MEDICINE

## 2025-07-09 PROCEDURE — 84460 ALANINE AMINO (ALT) (SGPT): CPT | Performed by: FAMILY MEDICINE

## 2025-07-09 PROCEDURE — G0103 PSA SCREENING: HCPCS | Performed by: FAMILY MEDICINE

## 2025-07-09 RX ORDER — PROPRANOLOL HYDROCHLORIDE 160 MG/1
160 CAPSULE, EXTENDED RELEASE ORAL DAILY
Qty: 90 CAPSULE | Refills: 4 | Status: SHIPPED | OUTPATIENT
Start: 2025-07-09

## 2025-07-09 RX ORDER — HYDROCHLOROTHIAZIDE 25 MG/1
25 TABLET ORAL DAILY
Qty: 90 TABLET | Refills: 4 | Status: SHIPPED | OUTPATIENT
Start: 2025-07-09

## 2025-07-09 RX ORDER — METFORMIN HYDROCHLORIDE 500 MG/1
1000 TABLET, EXTENDED RELEASE ORAL 2 TIMES DAILY WITH MEALS
Qty: 360 TABLET | Refills: 4 | Status: SHIPPED | OUTPATIENT
Start: 2025-07-09

## 2025-07-09 RX ORDER — MUPIROCIN 2 %
OINTMENT (GRAM) TOPICAL 3 TIMES DAILY
Qty: 30 G | Refills: 1 | Status: SHIPPED | OUTPATIENT
Start: 2025-07-09

## 2025-07-09 RX ORDER — LISINOPRIL 40 MG/1
40 TABLET ORAL DAILY
Qty: 90 TABLET | Refills: 4 | Status: SHIPPED | OUTPATIENT
Start: 2025-07-09

## 2025-07-09 RX ORDER — ATORVASTATIN CALCIUM 20 MG/1
20 TABLET, FILM COATED ORAL DAILY
Qty: 90 TABLET | Refills: 4 | Status: SHIPPED | OUTPATIENT
Start: 2025-07-09

## 2025-07-09 RX ORDER — ZOLPIDEM TARTRATE 10 MG/1
10 TABLET ORAL
Qty: 30 TABLET | Refills: 5 | Status: SHIPPED | OUTPATIENT
Start: 2025-07-09

## 2025-07-09 SDOH — HEALTH STABILITY: PHYSICAL HEALTH: ON AVERAGE, HOW MANY DAYS PER WEEK DO YOU ENGAGE IN MODERATE TO STRENUOUS EXERCISE (LIKE A BRISK WALK)?: 7 DAYS

## 2025-07-09 ASSESSMENT — SOCIAL DETERMINANTS OF HEALTH (SDOH): HOW OFTEN DO YOU GET TOGETHER WITH FRIENDS OR RELATIVES?: THREE TIMES A WEEK

## 2025-07-09 ASSESSMENT — PAIN SCALES - GENERAL: PAINLEVEL_OUTOF10: NO PAIN (0)

## 2025-07-09 NOTE — NURSING NOTE
"60 year old    Chief Complaint   Patient presents with    Recheck Medication    Physical     Dougie noted he has had some stiffness in his right middle finger        Blood pressure 132/81, pulse 88, temperature 97.5  F (36.4  C), temperature source Skin, height 1.85 m (6' 0.84\"), weight 124.7 kg (275 lb), SpO2 96%. Body mass index is 36.45 kg/m .    Patient Active Problem List   Diagnosis    Insomnia    Essential hypertension, benign    Wellness examination    Type 2 diabetes mellitus without complication, without long-term current use of insulin (H)    Morbid obesity (H)    Diverticulitis of colon    Loud snoring    Plantar fasciitis          Wt Readings from Last 2 Encounters:   07/09/25 124.7 kg (275 lb)   09/12/24 120.2 kg (265 lb)       BP Readings from Last 3 Encounters:   07/09/25 132/81   09/12/24 137/87   08/28/23 (!) 156/90             Current Outpatient Medications   Medication Sig Dispense Refill    Aspirin 81 MG TBDP Take 1 tablet by mouth daily      atorvastatin (LIPITOR) 20 MG tablet Take 1 tablet (20 mg) by mouth daily. 90 tablet 4    blood glucose monitoring (NO BRAND SPECIFIED) meter device kit Use to test blood sugar 2 times daily or as directed. 1 kit 0    blood glucose monitoring (NO BRAND SPECIFIED) test strip Use to test blood sugars 2 times daily or as directed 100 strip 3    Ginkgo Biloba (GINKGO PO) Take 1 tablet by mouth daily      Ginseng (ELEUTHERO PO) Take 1 tablet by mouth daily      hydrochlorothiazide (HYDRODIURIL) 25 MG tablet Take 1 tablet (25 mg) by mouth daily. 90 tablet 0    insulin  UNIT/ML vial 25 Units Inject 25 Units Subcutaneous in the morning and 25 units subcutaneously in the evening 10 mL 0    insulin syringe-needle U-100 (30G X 1/2\" 1 ML) 30G X 1/2\" 1 ML miscellaneous Use 2 syringes daily or as directed. 100 each 3    Lancets Misc. (MULTI-LANCET DEVICE 2) KIT 1 lancet 2 times daily As needed to check blood sugar levels 1 kit 1    lisinopril (ZESTRIL) 40 MG tablet " "Take 1 tablet (40 mg) by mouth daily. 90 tablet 4    metFORMIN (GLUCOPHAGE XR) 500 MG 24 hr tablet Take 2 tablets (1,000 mg) by mouth 2 times daily (with meals). 360 tablet 0    mupirocin (BACTROBAN) 2 % external ointment Apply topically 3 times daily. 30 g 1    propranolol ER (INDERAL LA) 160 MG 24 hr capsule Take 1 capsule (160 mg) by mouth daily. 90 capsule 0    zolpidem (AMBIEN) 10 MG tablet Take 1 tablet (10 mg) by mouth nightly as needed for sleep. 30 tablet 0     No current facility-administered medications for this visit.          Social History     Tobacco Use    Smoking status: Never    Smokeless tobacco: Never   Vaping Use    Vaping status: Never Used   Substance Use Topics    Alcohol use: Yes     Alcohol/week: 2.0 standard drinks of alcohol     Types: 2 Glasses of wine per week     Comment: rarely    Drug use: No          Health Maintenance Due   Topic Date Due    ADVANCE CARE PLANNING  Never done    EYE EXAM  08/19/2023    COVID-19 VACCINE (6 - 2024-25 season) 09/01/2024        No results found for: \"PAP\"        July 9, 2025 1:29 PM   "

## 2025-07-10 ENCOUNTER — PATIENT OUTREACH (OUTPATIENT)
Dept: CARE COORDINATION | Facility: CLINIC | Age: 60
End: 2025-07-10
Payer: COMMERCIAL

## 2025-07-10 LAB
ALT SERPL W P-5'-P-CCNC: 27 U/L (ref 0–70)
ANION GAP SERPL CALCULATED.3IONS-SCNC: 11 MMOL/L (ref 7–15)
BUN SERPL-MCNC: 19.8 MG/DL (ref 8–23)
CALCIUM SERPL-MCNC: 10 MG/DL (ref 8.8–10.4)
CHLORIDE SERPL-SCNC: 98 MMOL/L (ref 98–107)
CHOLEST SERPL-MCNC: 163 MG/DL
CREAT SERPL-MCNC: 0.9 MG/DL (ref 0.67–1.17)
EGFRCR SERPLBLD CKD-EPI 2021: >90 ML/MIN/1.73M2
FASTING STATUS PATIENT QL REPORTED: NO
FASTING STATUS PATIENT QL REPORTED: NO
GLUCOSE SERPL-MCNC: 274 MG/DL (ref 70–99)
HCO3 SERPL-SCNC: 31 MMOL/L (ref 22–29)
HDLC SERPL-MCNC: 42 MG/DL
LDLC SERPL CALC-MCNC: 83 MG/DL
NONHDLC SERPL-MCNC: 121 MG/DL
POTASSIUM SERPL-SCNC: 4.2 MMOL/L (ref 3.4–5.3)
PSA SERPL DL<=0.01 NG/ML-MCNC: 1.71 NG/ML (ref 0–3.5)
SODIUM SERPL-SCNC: 140 MMOL/L (ref 135–145)
TRIGL SERPL-MCNC: 190 MG/DL

## 2025-07-14 ENCOUNTER — PATIENT OUTREACH (OUTPATIENT)
Dept: CARE COORDINATION | Facility: CLINIC | Age: 60
End: 2025-07-14
Payer: COMMERCIAL

## 2025-07-21 NOTE — PROGRESS NOTES
Preventive Care Visit  [facility]  Live Richards MD, Family Medicine  Jul 9, 2025    Subjective   Dougie is a 60 year old, presenting for the following:  Recheck Medication and Physical (Dougie noted he has had some stiffness in his right middle finger)    HPI    Dougie is a 60-year-old presenting today for his annual wellness visit.  Overall, he is doing quite well.  He changed jobs and he absolutely loves his current 1.  This is made a world of difference for him in terms of his overall wellbeing.    He is up-to-date with almost all healthcare maintenance strategies.    The only issue that is bothering him is that he has some stiffness in his right middle finger.  It does not seem to get caught and does not snap or pop.  He is right-hand dominant.        7/9/2025   General Health   How would you rate your overall physical health? Good   Feel stress (tense, anxious, or unable to sleep) Only a little         7/9/2025   Nutrition   Three or more servings of calcium each day? Yes   Diet: Diabetic   How many servings of fruit and vegetables per day? 4 or more   How many sweetened beverages each day? 0-1         7/9/2025   Exercise   Days per week of moderate/strenous exercise 7 days         7/9/2025   Social Factors   Frequency of gathering with friends or relatives Three times a week   Worry food won't last until get money to buy more No   Food not last or not have enough money for food? No   Do you have housing? (Housing is defined as stable permanent housing and does not include staying outside in a car, in a tent, in an abandoned building, in an overnight shelter, or couch-surfing.) Yes   Are you worried about losing your housing? No   Lack of transportation? No   Unable to get utilities (heat,electricity)? No         7/9/2025   Dental   Dentist two times every year? (!) NO     Today's PHQ-2 Score:       7/9/2025     1:25 PM   PHQ-2 ( 1999 [employer])   Q1: Little interest or pleasure in doing things 0   Q2:  "Feeling down, depressed or hopeless 0   PHQ-2 Score 0         7/9/2025   Substance Use   Alcohol more than 3/day or more than 7/wk No   Do you use any other substances recreationally? No     Social History     Tobacco Use    Smoking status: Never    Smokeless tobacco: Never   Vaping Use    Vaping status: Never Used   Substance Use Topics    Alcohol use: Yes     Alcohol/week: 2.0 standard drinks of alcohol     Types: 2 Glasses of wine per week     Comment: rarely    Drug use: No     Last PSA:   PSA   Date Value Ref Range Status   02/25/2021 2.02 0 - 4 ug/L Final     Comment:     Assay Method:  Chemiluminescence using Siemens Vista analyzer     Prostate Specific Antigen Screen   Date Value Ref Range Status   07/09/2025 1.71 0.00 - 3.50 ng/mL Final     ASCVD Risk   The 10-year ASCVD risk score (Carolyn VELARDE, et al., 2019) is: 16.9%    Values used to calculate the score:      Age: 60 years      Sex: Male      Is Non- : No      Diabetic: Yes      Tobacco smoker: No      Systolic Blood Pressure: 132 mmHg      Is BP treated: Yes      HDL Cholesterol: 42 mg/dL      Total Cholesterol: 163 mg/dL    Review of Systems  Constitutional, neuro, ENT, endocrine, pulmonary, cardiac, gastrointestinal, genitourinary, musculoskeletal, integument and psychiatric systems are negative, except as otherwise noted.     Objective    Exam  /81 (BP Location: Left arm, Patient Position: Sitting, Cuff Size: Adult Large)   Pulse 88   Temp 97.5  F (36.4  C) (Skin)   Ht 1.85 m (6' 0.84\")   Wt 124.7 kg (275 lb)   SpO2 96%   BMI 36.45 kg/m     Estimated body mass index is 36.45 kg/m  as calculated from the following:    Height as of this encounter: 1.85 m (6' 0.84\").    Weight as of this encounter: 124.7 kg (275 lb).    Physical Exam  GENERAL: alert and no distress  EYES: Eyes grossly normal to inspection, PERRL and conjunctivae and sclerae normal  HENT: ear canals and TM's normal, nose and mouth without ulcers " or lesions  NECK: no adenopathy, no asymmetry, masses, or scars  RESP: lungs clear to auscultation - no rales, rhonchi or wheezes  CV: regular rate and rhythm, normal S1 S2, no S3 or S4, no murmur, click or rub, no peripheral edema  MS: no gross musculoskeletal defects noted, no edema.  The metacarpophalangeal joint and the proximal and distal interphalangeal joints of the right third finger appear normal.  No evidence of a trigger finger.  No significant swelling and no evidence of any erythema.  Palpation does not reveal any synovial thickening.  SKIN: no suspicious lesions or rashes  NEURO: Normal strength and tone, mentation intact and speech normal  PSYCH: mentation appears normal, affect normal/bright    Laboratory studies: Lipid panel, ALT, basic metabolic panel, A1c, PSA, all pending.      Assessment/Plan:    Dougie is a 60-year-old presenting today for his annual wellness visit.  He is feeling quite well.  He is up-to-date with almost all healthcare maintenance strategies.    Referral has been placed for an eye exam.    Refills of his mupirocin, propranolol, and zolpidem have been taking care of.    He will be notified of the laboratory results.  Based on the results, we will follow through as needed.    I look forward to seeing Dougie back in approximately 1 year.  He will reach out the meantime with any questions or concerns.      Signed Electronically by: Live Richards MD

## 2025-08-07 ENCOUNTER — VIRTUAL VISIT (OUTPATIENT)
Dept: FAMILY MEDICINE | Facility: CLINIC | Age: 60
End: 2025-08-07
Payer: COMMERCIAL

## 2025-08-07 DIAGNOSIS — E11.9 DIABETES MELLITUS, TYPE 2 (H): Primary | ICD-10-CM

## 2025-08-10 ENCOUNTER — MYC REFILL (OUTPATIENT)
Dept: FAMILY MEDICINE | Facility: CLINIC | Age: 60
End: 2025-08-10

## 2025-08-10 DIAGNOSIS — Z79.4 TYPE 2 DIABETES MELLITUS WITH HYPERGLYCEMIA, WITH LONG-TERM CURRENT USE OF INSULIN (H): ICD-10-CM

## 2025-08-10 DIAGNOSIS — E11.65 TYPE 2 DIABETES MELLITUS WITH HYPERGLYCEMIA, WITH LONG-TERM CURRENT USE OF INSULIN (H): ICD-10-CM

## 2025-09-02 ENCOUNTER — VIRTUAL VISIT (OUTPATIENT)
Dept: FAMILY MEDICINE | Facility: CLINIC | Age: 60
End: 2025-09-02
Payer: COMMERCIAL

## 2025-09-02 DIAGNOSIS — E11.9 TYPE 2 DIABETES MELLITUS WITHOUT COMPLICATION, WITHOUT LONG-TERM CURRENT USE OF INSULIN (H): ICD-10-CM

## 2025-09-02 DIAGNOSIS — E11.65 TYPE 2 DIABETES MELLITUS WITH HYPERGLYCEMIA, WITH LONG-TERM CURRENT USE OF INSULIN (H): Primary | ICD-10-CM

## 2025-09-02 DIAGNOSIS — Z79.4 TYPE 2 DIABETES MELLITUS WITH HYPERGLYCEMIA, WITH LONG-TERM CURRENT USE OF INSULIN (H): Primary | ICD-10-CM

## 2025-09-02 RX ORDER — HYDROCHLOROTHIAZIDE 12.5 MG/1
CAPSULE ORAL
Qty: 2 EACH | Refills: 3 | Status: SHIPPED | OUTPATIENT
Start: 2025-09-02

## 2025-12-01 ENCOUNTER — PRE VISIT (OUTPATIENT)
Dept: OPHTHALMOLOGY | Facility: CLINIC | Age: 60
End: 2025-12-01